# Patient Record
Sex: FEMALE | Race: WHITE | NOT HISPANIC OR LATINO | Employment: PART TIME | URBAN - METROPOLITAN AREA
[De-identification: names, ages, dates, MRNs, and addresses within clinical notes are randomized per-mention and may not be internally consistent; named-entity substitution may affect disease eponyms.]

---

## 2017-02-10 ENCOUNTER — OFFICE VISIT (OUTPATIENT)
Dept: URGENT CARE | Facility: CLINIC | Age: 27
End: 2017-02-10
Payer: COMMERCIAL

## 2017-02-10 VITALS
SYSTOLIC BLOOD PRESSURE: 118 MMHG | HEART RATE: 119 BPM | DIASTOLIC BLOOD PRESSURE: 76 MMHG | WEIGHT: 173.6 LBS | TEMPERATURE: 98.7 F | OXYGEN SATURATION: 97 % | BODY MASS INDEX: 27.9 KG/M2 | RESPIRATION RATE: 18 BRPM | HEIGHT: 66 IN

## 2017-02-10 DIAGNOSIS — J02.0 STREP THROAT: ICD-10-CM

## 2017-02-10 DIAGNOSIS — R21 RASH: ICD-10-CM

## 2017-02-10 LAB
INT CON NEG: NEGATIVE
INT CON POS: POSITIVE
S PYO AG THROAT QL: POSITIVE

## 2017-02-10 PROCEDURE — 87880 STREP A ASSAY W/OPTIC: CPT | Performed by: PHYSICIAN ASSISTANT

## 2017-02-10 PROCEDURE — 99214 OFFICE O/P EST MOD 30 MIN: CPT | Mod: 25 | Performed by: PHYSICIAN ASSISTANT

## 2017-02-10 RX ORDER — AZITHROMYCIN 500 MG/1
500 TABLET, FILM COATED ORAL DAILY
Qty: 5 TAB | Refills: 0 | Status: SHIPPED | OUTPATIENT
Start: 2017-02-10 | End: 2017-02-15

## 2017-02-10 ASSESSMENT — ENCOUNTER SYMPTOMS
CHILLS: 0
HEADACHES: 0
SORE THROAT: 1
SWOLLEN GLANDS: 1
SHORTNESS OF BREATH: 0
PALPITATIONS: 0
COUGH: 0
FEVER: 1

## 2017-02-10 NOTE — MR AVS SNAPSHOT
"        Brooke Thomas   2/10/2017 8:15 AM   Office Visit   MRN: [id number]    Department:  [facility]   Dept Phone:  [phone]    Description:  Female : 1990   Provider:  Gui Marshall PA-C           Reason for Visit     Sore Throat x saturday started having a rash, developed a small rash on hip monday, nasal congestion       Allergies as of 2/10/2017     Allergen Noted Reactions    Food 2014   Hives    Shrimp,walnuts (avoids all nuts), apples, and tomatoes.    Amoxicillin 2009   Rash      You were diagnosed with     Sore throat   [[id number]]         Vital Signs     Blood Pressure Pulse Temperature Respirations Height Weight    118/76 mmHg 119 37.1 °C (98.7 °F) 18 1.676 m (5' 6\") 78.744 kg (173 lb 9.6 oz)    Body Mass Index Oxygen Saturation Smoking Status             28.03 kg/m2 97% Never Smoker          Basic Information     Date Of Birth Sex Race Ethnicity Preferred Language    1990 Female White Non- English      Problem List              ICD-10-CM Priority Class Noted - Resolved    Acute pain of left knee M25.562   10/6/2016 - Present      Health Maintenance        Date Due Completion Dates    IMM DTaP/Tdap/Td Vaccine (1 - Tdap) 2007 ---    PAP SMEAR 2014 (Done)    Override on 2011: Done    IMM INFLUENZA (1) 2016 ---            Current Immunizations     No immunizations on file.      Below and/or attached are the medications your provider expects you to take. Review all of your home medications and newly ordered medications with your provider and/or pharmacist. Follow medication instructions as directed by your provider and/or pharmacist. Please keep your medication list with you and share with your provider. Update the information when medications are discontinued, doses are changed, or new medications (including over-the-counter products) are added; and carry medication information at all times in the event of emergency situations    " Allergies:  FOOD - Hives     AMOXICILLIN - Rash               Medications  Valid as of: February 10, 2017 -  8:26 AM    Generic Name Brand Name Tablet Size Instructions for use    EPINEPHrine (Device) EPINEPHrine 0.3 MG/0.3ML 0.3 mg by Injection route Once PRN (for anaphylactic reaction) for 1 dose.        Naproxen (Tab) NAPROSYN 500 MG Take 1 Tab by mouth 2 times a day, with meals.        .                 Medicines prescribed today were sent to:     [id number], [place] - [address] [place] [address]    Phone: [phone] Fax: [phone]    Open 24 Hours?: No      Medication refill instructions:       If your prescription bottle indicates you have medication refills left, it is not necessary to call your provider’s office. Please contact your pharmacy and they will refill your medication.    If your prescription bottle indicates you do not have any refills left, you may request refills at any time through one of the following ways: The online [employer] system (except Urgent Care), by calling your provider’s office, or by asking your pharmacy to contact your provider’s office with a refill request. Medication refills are processed only during regular business hours and may not be available until the next business day. Your provider may request additional information or to have a follow-up visit with you prior to refilling your medication.   *Please Note: Medication refills are assigned a new Rx number when refilled electronically. Your pharmacy may indicate that no refills were authorized even though a new prescription for the same medication is available at the pharmacy. Please request the medicine by name with the pharmacy before contacting your provider for a refill.           Artk[employer] Access Code: Activation code not generated  Current Artk[employer] Status: Active

## 2017-02-10 NOTE — PROGRESS NOTES
Subjective:      Brooke Thomas is a 26 y.o. female who presents with Sore Throat            Pharyngitis   This is a new problem. Episode onset: 3 days ago. The problem has been unchanged. The fever has been present for less than 1 day. The pain is mild. Associated symptoms include congestion and swollen glands. Pertinent negatives include no coughing, ear pain, headaches or shortness of breath. Associated symptoms comments: Rash  . She has tried nothing for the symptoms.       Review of Systems   Constitutional: Positive for fever. Negative for chills.   HENT: Positive for congestion and sore throat. Negative for ear pain.    Respiratory: Negative for cough and shortness of breath.    Cardiovascular: Negative for chest pain and palpitations.   Skin: Positive for rash. Negative for itching.   Neurological: Negative for headaches.     All other systems reviewed and are negative.  PMH:  has a past medical history of Food allergy.  MEDS:   Current outpatient prescriptions:   •  azithromycin (ZITHROMAX) 500 MG tablet, Take 1 Tab by mouth every day for 5 days., Disp: 5 Tab, Rfl: 0  •  EPINEPHrine 0.3 MG/0.3ML JOHN, 0.3 mg by Injection route Once PRN (for anaphylactic reaction) for 1 dose., Disp: 1 Device, Rfl: 11  •  naproxen (NAPROSYN) 500 MG Tab, Take 1 Tab by mouth 2 times a day, with meals., Disp: 30 Tab, Rfl: 0  ALLERGIES:   Allergies   Allergen Reactions   • Food Hives     Shrimp,walnuts (avoids all nuts), apples, and tomatoes.   • Amoxicillin Rash     SURGHX:   Past Surgical History   Procedure Laterality Date   • Septoplasty     • Septoplasty       [place] - 16 yrs old     SOCHX:  reports that she has never smoked. She has never used smokeless tobacco. She reports that she does not drink alcohol or use illicit drugs.  FH: Family history was reviewed, no pertinent findings to report  Medications, Allergies, and current problem list reviewed today in Epic       Objective:     /76 mmHg  Pulse 119   "Temp([facility]) 37.1 °C (98.7 °F)  Resp 18  Ht 1.676 m (5' 6\")  Wt 78.744 kg (173 lb 9.6 oz)  BMI 28.03 kg/m2  SpO2 97%     Physical Exam   Constitutional: She is oriented to person, place, and time. Vital signs are normal. She appears well-developed and well-nourished.   HENT:   Head: Normocephalic and atraumatic.   Right Ear: Hearing, tympanic membrane, external ear and ear canal normal.   Left Ear: Hearing, tympanic membrane, external ear and ear canal normal.   Nose: Nose normal.   Mouth/Throat: Uvula is midline and mucous membranes are normal. Posterior oropharyngeal erythema present. No oropharyngeal exudate, posterior oropharyngeal edema or tonsillar abscesses.   Neck: Normal range of motion. Neck supple.   Cardiovascular: Normal rate and regular rhythm.  Exam reveals no gallop and no friction rub.    No murmur heard.  Pulmonary/Chest: Effort normal and breath sounds normal. She has no wheezes. She has no rales.   Lymphadenopathy:     She has cervical adenopathy.   Neurological: She is alert and oriented to person, place, and time.   Skin: Skin is warm and dry. Rash noted.   Multiple erythematic papules on right hip   Psychiatric: She has a normal mood and affect. Her behavior is normal. Judgment and thought content normal.   Vitals reviewed.              Assessment/Plan:   Pt is a 26 year old female who presents with a sore throat for 3 days.  The pain is gradually worsening. Patient reports fever and rash. Denies cough. Physical exam is remarkable for posterior pharynx erythema without exudate.  Rapid Strep is positive.   There is a rash on her right hip with multiple erythematic papules.  It is mildly irritating.  This may be attributed to the strep although it is not the classic appearance of a fine sandpaper rash.  Allergy to amoxicillin.    Rapid Strep: positive    1. Strep throat    - POCT Rapid Strep A  - azithromycin (ZITHROMAX) 500 MG tablet; Take 1 Tab by mouth every day for 5 days.  Dispense: 5 " Tab; Refill: 0    2. Rash  -hydrocortisone cream  -observe  Differential diagnosis, natural history, supportive care, and indications for immediate follow-up discussed at length.   Follow-up with primary care provider within 4-5 days, emergency room precautions discussed.  Patient and/or family appears understanding of information.

## 2017-02-13 ENCOUNTER — OFFICE VISIT (OUTPATIENT)
Dept: URGENT CARE | Facility: CLINIC | Age: 27
End: 2017-02-13
Payer: COMMERCIAL

## 2017-02-13 VITALS
HEIGHT: 66 IN | RESPIRATION RATE: 16 BRPM | OXYGEN SATURATION: 98 % | BODY MASS INDEX: 28.14 KG/M2 | TEMPERATURE: 99.2 F | DIASTOLIC BLOOD PRESSURE: 78 MMHG | HEART RATE: 102 BPM | WEIGHT: 175.1 LBS | SYSTOLIC BLOOD PRESSURE: 118 MMHG

## 2017-02-13 DIAGNOSIS — B02.8 HERPES ZOSTER WITH COMPLICATION: ICD-10-CM

## 2017-02-13 PROCEDURE — 99214 OFFICE O/P EST MOD 30 MIN: CPT | Performed by: NURSE PRACTITIONER

## 2017-02-13 RX ORDER — VALACYCLOVIR HYDROCHLORIDE 1 G/1
1000 TABLET, FILM COATED ORAL 3 TIMES DAILY
Qty: 21 TAB | Refills: 0 | Status: ON HOLD | OUTPATIENT
Start: 2017-02-13 | End: 2017-11-09

## 2017-02-13 RX ORDER — IBUPROFEN 800 MG/1
800 TABLET ORAL EVERY 8 HOURS PRN
Qty: 90 TAB | Refills: 1 | Status: ON HOLD | OUTPATIENT
Start: 2017-02-13 | End: 2017-11-09

## 2017-02-13 ASSESSMENT — ENCOUNTER SYMPTOMS
CHILLS: 0
TINGLING: 0
FEVER: 0

## 2017-02-13 NOTE — MR AVS SNAPSHOT
"        Brooke Suresht   2017 3:30 PM   Office Visit   MRN: [id number]    Department:  [facility]   Dept Phone:  [phone]    Description:  Female : 1990   Provider:  DAKOTA Mejia           Reason for Visit     Rash x10 days hip area, painful      Allergies as of 2017     Allergen Noted Reactions    Food 2014   Hives    Shrimp,walnuts (avoids all nuts), apples, and tomatoes.    Amoxicillin 2009   Rash      You were diagnosed with     Herpes zoster with complication   [[id number]]         Vital Signs     Blood Pressure Pulse Temperature Respirations Height Weight    118/78 mmHg 102 37.3 °C (99.2 °F) 16 1.676 m (5' 5.98\") 79.425 kg (175 lb 1.6 oz)    Body Mass Index Oxygen Saturation Smoking Status             28.28 kg/m2 98% Never Smoker          Basic Information     Date Of Birth Sex Race Ethnicity Preferred Language    1990 Female White Non- English      Problem List              ICD-10-CM Priority Class Noted - Resolved    Acute pain of left knee M25.562   10/6/2016 - Present      Health Maintenance        Date Due Completion Dates    IMM DTaP/Tdap/Td Vaccine (1 - Tdap) 2007 ---    PAP SMEAR 2014 (Done)    Override on 2011: Done    IMM INFLUENZA (1) 2016 ---            Current Immunizations     No immunizations on file.      Below and/or attached are the medications your provider expects you to take. Review all of your home medications and newly ordered medications with your provider and/or pharmacist. Follow medication instructions as directed by your provider and/or pharmacist. Please keep your medication list with you and share with your provider. Update the information when medications are discontinued, doses are changed, or new medications (including over-the-counter products) are added; and carry medication information at all times in the event of emergency situations     Allergies:  FOOD - Hives     AMOXICILLIN - Rash    "            Medications  Valid as of: February 13, 2017 -  3:50 PM    Generic Name Brand Name Tablet Size Instructions for use    Azithromycin (Tab) ZITHROMAX 500 MG Take 1 Tab by mouth every day for 5 days.        EPINEPHrine (Device) EPINEPHrine 0.3 MG/0.3ML 0.3 mg by Injection route Once PRN (for anaphylactic reaction) for 1 dose.        Ibuprofen (Tab) MOTRIN 800 MG Take 1 Tab by mouth every 8 hours as needed.        Lidocaine (Gel) Lidocaine 0.5 % 1 Application by Apply externally route 3 times a day.        Naproxen (Tab) NAPROSYN 500 MG Take 1 Tab by mouth 2 times a day, with meals.        ValACYclovir HCl (Tab) VALTREX 1 GM Take 1 Tab by mouth 3 times a day.        .                 Medicines prescribed today were sent to:     [facility] - [place], [place] - [address] [place] [address]    Phone: [phone] Fax: [phone]    Open 24 Hours?: No      Medication refill instructions:       If your prescription bottle indicates you have medication refills left, it is not necessary to call your provider’s office. Please contact your pharmacy and they will refill your medication.    If your prescription bottle indicates you do not have any refills left, you may request refills at any time through one of the following ways: The online [employer] system (except Urgent Care), by calling your provider’s office, or by asking your pharmacy to contact your provider’s office with a refill request. Medication refills are processed only during regular business hours and may not be available until the next business day. Your provider may request additional information or to have a follow-up visit with you prior to refilling your medication.   *Please Note: Medication refills are assigned a new Rx number when refilled electronically. Your pharmacy may indicate that no refills were authorized even though a new prescription for the same medication is available at the pharmacy. Please request the medicine  by name with the pharmacy before contacting your provider for a refill.           MyChart Access Code: Activation code not generated  Current MyChart Status: Active

## 2017-02-13 NOTE — PROGRESS NOTES
"Subjective:      Brooke Thomas is a 26 y.o. female who presents with Rash            Rash  This is a new problem. The current episode started 1 to 4 weeks ago. The problem is unchanged. The affected locations include the right hip. The rash is characterized by blistering and redness. She was exposed to nothing. Associated symptoms include congestion. Pertinent negatives include no fever. Past treatments include antihistamine and anti-itch cream. Her past medical history is significant for varicella.       Review of Systems   Constitutional: Negative for fever, chills and malaise/fatigue.   HENT: Positive for congestion.    Skin: Positive for rash.   Neurological: Negative for tingling.          Objective:     /78 mmHg  Pulse 102  Temp(Src) 37.3 °C (99.2 °F)  Resp 16  Ht 1.676 m (5' 5.98\")  Wt 79.425 kg (175 lb 1.6 oz)  BMI 28.28 kg/m2  SpO2 98%     Physical Exam   Constitutional: She is oriented to person, place, and time. She appears well-developed and well-nourished. No distress.   Cardiovascular: Normal rate, regular rhythm and normal heart sounds.    No murmur heard.  Pulmonary/Chest: Effort normal and breath sounds normal.   Musculoskeletal: Normal range of motion.   Neurological: She is alert and oriented to person, place, and time.   Skin: Skin is warm. Rash noted. Rash is vesicular. There is erythema.        Nursing note and vitals reviewed.              Assessment/Plan:     1. Herpes zoster with complication  valacyclovir (VALTREX) 1 GM Tab    Lidocaine 0.5 % Gel    ibuprofen (MOTRIN) 800 MG Tab     Differential diagnosis, natural history, supportive care, and indications for immediate follow-up discussed at length.         "

## 2017-04-19 ENCOUNTER — HOSPITAL ENCOUNTER (OUTPATIENT)
Dept: LAB | Facility: MEDICAL CENTER | Age: 27
End: 2017-04-19
Attending: NURSE PRACTITIONER
Payer: COMMERCIAL

## 2017-04-19 LAB
ABO GROUP BLD: NORMAL
BASOPHILS # BLD AUTO: 0.1 % (ref 0–1.8)
BASOPHILS # BLD: 0.01 K/UL (ref 0–0.12)
BLD GP AB SCN SERPL QL: NORMAL
EOSINOPHIL # BLD AUTO: 0.24 K/UL (ref 0–0.51)
EOSINOPHIL NFR BLD: 2 % (ref 0–6.9)
ERYTHROCYTE [DISTWIDTH] IN BLOOD BY AUTOMATED COUNT: 43.7 FL (ref 35.9–50)
HBV SURFACE AG SER QL: NEGATIVE
HCT VFR BLD AUTO: 40.8 % (ref 37–47)
HGB BLD-MCNC: 13.8 G/DL (ref 12–16)
IMM GRANULOCYTES # BLD AUTO: 0.05 K/UL (ref 0–0.11)
IMM GRANULOCYTES NFR BLD AUTO: 0.4 % (ref 0–0.9)
LYMPHOCYTES # BLD AUTO: 2.48 K/UL (ref 1–4.8)
LYMPHOCYTES NFR BLD: 20.5 % (ref 22–41)
MCH RBC QN AUTO: 30.8 PG (ref 27–33)
MCHC RBC AUTO-ENTMCNC: 33.8 G/DL (ref 33.6–35)
MCV RBC AUTO: 91.1 FL (ref 81.4–97.8)
MONOCYTES # BLD AUTO: 0.75 K/UL (ref 0–0.85)
MONOCYTES NFR BLD AUTO: 6.2 % (ref 0–13.4)
NEUTROPHILS # BLD AUTO: 8.57 K/UL (ref 2–7.15)
NEUTROPHILS NFR BLD: 70.8 % (ref 44–72)
NRBC # BLD AUTO: 0 K/UL
NRBC BLD AUTO-RTO: 0 /100 WBC
PLATELET # BLD AUTO: 302 K/UL (ref 164–446)
PMV BLD AUTO: 10.6 FL (ref 9–12.9)
RBC # BLD AUTO: 4.48 M/UL (ref 4.2–5.4)
RH BLD: NORMAL
RUBV AB SER QL: 9.8 IU/ML
TREPONEMA PALLIDUM IGG+IGM AB [PRESENCE] IN SERUM OR PLASMA BY IMMUNOASSAY: NON REACTIVE
WBC # BLD AUTO: 12.1 K/UL (ref 4.8–10.8)

## 2017-04-19 PROCEDURE — 81220 CFTR GENE COM VARIANTS: CPT

## 2017-04-19 PROCEDURE — 86900 BLOOD TYPING SEROLOGIC ABO: CPT

## 2017-04-19 PROCEDURE — 86762 RUBELLA ANTIBODY: CPT

## 2017-04-19 PROCEDURE — 86850 RBC ANTIBODY SCREEN: CPT

## 2017-04-19 PROCEDURE — 85025 COMPLETE CBC W/AUTO DIFF WBC: CPT

## 2017-04-19 PROCEDURE — 86901 BLOOD TYPING SEROLOGIC RH(D): CPT

## 2017-04-19 PROCEDURE — 86780 TREPONEMA PALLIDUM: CPT

## 2017-04-19 PROCEDURE — 87340 HEPATITIS B SURFACE AG IA: CPT

## 2017-04-19 PROCEDURE — 87086 URINE CULTURE/COLONY COUNT: CPT

## 2017-04-21 LAB
BACTERIA UR CULT: NORMAL
SIGNIFICANT IND 70042: NORMAL
SITE SITE: NORMAL
SOURCE SOURCE: NORMAL

## 2017-04-25 LAB
CF EXPANDED VARIANT PANEL INTERP Q4864: ABNORMAL
CFTR ALLELE 1 BLD/T QL: ABNORMAL
CFTR ALLELE 1 BLD/T QL: NEGATIVE
CFTR MUT ANL BLD/T: ABNORMAL

## 2017-05-08 ENCOUNTER — HOSPITAL ENCOUNTER (OUTPATIENT)
Dept: LAB | Facility: MEDICAL CENTER | Age: 27
End: 2017-05-08
Attending: OBSTETRICS & GYNECOLOGY
Payer: COMMERCIAL

## 2017-05-08 PROCEDURE — 84702 CHORIONIC GONADOTROPIN TEST: CPT

## 2017-05-08 PROCEDURE — 36415 COLL VENOUS BLD VENIPUNCTURE: CPT

## 2017-05-08 PROCEDURE — 84163 PAPPA SERUM: CPT

## 2017-05-11 LAB
# FETUSES US: 1
ADDITIONAL US Z4552: NORMAL
AGE AT DELIVERY: 29.6 YEARS
COMMENT  Z4564: NORMAL
FET CRL US.MEAS: 64 MM
FET NUCHAL FOLD MOM THICKNESS US.MEAS: 0.9
FET NUCHAL FOLD THICKNESS US.MEAS: 1.5 MM
FET TS 18 RISK FROM MAT AGE: NORMAL
FET TS 21 RISK FROM MAT AGE: NORMAL
GA: 12.7 WEEKS
HCG ADJ MOM SERPL: 1.06
HCG SERPL-ACNC: 81.7 IU/ML
INTEGRATED SCN PATIENT-IMP: NORMAL
NOTE Z4565: NORMAL
PAPP-A MOM SERPL: 0.38
PAPP-A SERPL-MCNC: 314.3 NG/ML
RESULTS Z4535: NORMAL
SONOGRAPHER: NORMAL
SUBMIT PART2 SAMPLE USING Z4537: NORMAL
TS 18 RISK FETUS: NORMAL
TS 21 RISK FETUS: NORMAL
US DATE: NORMAL

## 2017-06-01 ENCOUNTER — HOSPITAL ENCOUNTER (OUTPATIENT)
Dept: LAB | Facility: MEDICAL CENTER | Age: 27
End: 2017-06-01
Attending: OBSTETRICS & GYNECOLOGY
Payer: COMMERCIAL

## 2017-06-01 PROCEDURE — 82677 ASSAY OF ESTRIOL: CPT

## 2017-06-01 PROCEDURE — 82105 ALPHA-FETOPROTEIN SERUM: CPT

## 2017-06-01 PROCEDURE — 84702 CHORIONIC GONADOTROPIN TEST: CPT

## 2017-06-01 PROCEDURE — 36415 COLL VENOUS BLD VENIPUNCTURE: CPT

## 2017-06-01 PROCEDURE — 86336 INHIBIN A: CPT

## 2017-06-05 LAB
# FETUSES US: 1
ADDITIONAL US Z4552: NORMAL
AFP ADJ MOM SERPL: 0.81
AFP SERPL-MCNC: 22.7 NG/ML
AGE AT DELIVERY: 29.6 YEARS
COLLECT DATE: NORMAL
COLLECT DATE: NORMAL
COMMENT  Z4564: NORMAL
FET CRL US.MEAS: 64 MM
FET NUCHAL FOLD MOM THICKNESS US.MEAS: 0.9
FET NUCHAL FOLD THICKNESS US.MEAS: 1.5 MM
FET TS 18 RISK FROM MAT AGE: NORMAL
FET TS 21 RISK FROM MAT AGE: NORMAL
GA: 12.7 WEEKS
GA: 16.3 WEEKS
HCG ADJ MOM SERPL: 0.9
HCG SERPL-ACNC: 27.2 IU/ML
IDDM PATIENT QL: NO
INHIBIN A ADJ MOM SERPL: 1.1
INHIBIN A SERPL-MCNC: 173.5 PG/ML
INTEGRATED SCN PATIENT-IMP: NORMAL
NEURAL TUBE DEFECT RISK FETUS: NORMAL %
NOTE Z4565: NORMAL
PAPP-A MOM SERPL: 0.38
PAPP-A SERPL-MCNC: 314.3 NG/ML
RESULTS Z4535: NORMAL
SONOGRAPHER: NORMAL
TS 18 RISK FETUS: NORMAL
TS 21 RISK FETUS: NORMAL
U ESTRIOL ADJ MOM SERPL: 1.24
U ESTRIOL SERPL-MCNC: 1.02 NG/ML
US DATE: NORMAL

## 2017-07-08 ENCOUNTER — OFFICE VISIT (OUTPATIENT)
Dept: URGENT CARE | Facility: CLINIC | Age: 27
End: 2017-07-08
Payer: COMMERCIAL

## 2017-07-08 ENCOUNTER — HOSPITAL ENCOUNTER (OUTPATIENT)
Facility: MEDICAL CENTER | Age: 27
End: 2017-07-08
Attending: PHYSICIAN ASSISTANT
Payer: COMMERCIAL

## 2017-07-08 VITALS
BODY MASS INDEX: 30.84 KG/M2 | DIASTOLIC BLOOD PRESSURE: 80 MMHG | WEIGHT: 191 LBS | TEMPERATURE: 98.2 F | HEART RATE: 92 BPM | OXYGEN SATURATION: 99 % | RESPIRATION RATE: 16 BRPM | SYSTOLIC BLOOD PRESSURE: 112 MMHG

## 2017-07-08 DIAGNOSIS — L91.8 INFLAMED SKIN TAG: ICD-10-CM

## 2017-07-08 PROCEDURE — 99000 SPECIMEN HANDLING OFFICE-LAB: CPT | Performed by: PHYSICIAN ASSISTANT

## 2017-07-08 PROCEDURE — 88304 TISSUE EXAM BY PATHOLOGIST: CPT

## 2017-07-08 PROCEDURE — 99213 OFFICE O/P EST LOW 20 MIN: CPT | Performed by: PHYSICIAN ASSISTANT

## 2017-07-08 ASSESSMENT — ENCOUNTER SYMPTOMS
MUSCULOSKELETAL NEGATIVE: 1
CONSTITUTIONAL NEGATIVE: 1
NEUROLOGICAL NEGATIVE: 1

## 2017-07-08 NOTE — MR AVS SNAPSHOT
Brooke Thomas   2017 1:45 PM   Office Visit   MRN: [id number]    Department:  [facility]   Dept Phone:  [phone]    Description:  Female : 1990   Provider:  Kapil Ho PA-C           Reason for Visit     Nevus x 1 year  mold on Rt. side, getting red in the last few days      Allergies as of 2017     Allergen Noted Reactions    Food 2014   Hives    Shrimp,walnuts (avoids all nuts), apples, and tomatoes.    Amoxicillin 2009   Rash      You were diagnosed with     Inflamed skin tag   [[id number]]         Vital Signs     Blood Pressure Pulse Temperature Respirations Weight Oxygen Saturation    112/80 mmHg 92 36.8 °C (98.2 °F) 16 86.637 kg (191 lb) 99%    Smoking Status                   Never Smoker            Basic Information     Date Of Birth Sex Race Ethnicity Preferred Language    1990 Female White Non- English      Problem List              ICD-10-CM Priority Class Noted - Resolved    Acute pain of left knee M25.562   10/6/2016 - Present      Health Maintenance        Date Due Completion Dates    IMM DTaP/Tdap/Td Vaccine (1 - Tdap) 2007 ---    PAP SMEAR 2014 (Done)    Override on 2011: Done    IMM INFLUENZA (1) 2017 ---            Current Immunizations     No immunizations on file.      Below and/or attached are the medications your provider expects you to take. Review all of your home medications and newly ordered medications with your provider and/or pharmacist. Follow medication instructions as directed by your provider and/or pharmacist. Please keep your medication list with you and share with your provider. Update the information when medications are discontinued, doses are changed, or new medications (including over-the-counter products) are added; and carry medication information at all times in the event of emergency situations     Allergies:  FOOD - Hives     AMOXICILLIN - Rash               Medications  Valid as of:  July 08, 2017 -  4:07 PM    Generic Name Brand Name Tablet Size Instructions for use    EPINEPHrine (Device) EPINEPHrine 0.3 MG/0.3ML 0.3 mg by Injection route Once PRN (for anaphylactic reaction) for 1 dose.        Ibuprofen (Tab) MOTRIN 800 MG Take 1 Tab by mouth every 8 hours as needed.        Lidocaine (Gel) Lidocaine 0.5 % 1 Application by Apply externally route 3 times a day.        Naproxen (Tab) NAPROSYN 500 MG Take 1 Tab by mouth 2 times a day, with meals.        ValACYclovir HCl (Tab) VALTREX 1 GM Take 1 Tab by mouth 3 times a day.        .                 Medicines prescribed today were sent to:     [facility] - [place], [place] - [address] [facility]    [address] [place] [address]    Phone: [phone] Fax: [phone]    Open 24 Hours?: No      Medication refill instructions:       If your prescription bottle indicates you have medication refills left, it is not necessary to call your provider’s office. Please contact your pharmacy and they will refill your medication.    If your prescription bottle indicates you do not have any refills left, you may request refills at any time through one of the following ways: The online [employer] system (except Urgent Care), by calling your provider’s office, or by asking your pharmacy to contact your provider’s office with a refill request. Medication refills are processed only during regular business hours and may not be available until the next business day. Your provider may request additional information or to have a follow-up visit with you prior to refilling your medication.   *Please Note: Medication refills are assigned a new Rx number when refilled electronically. Your pharmacy may indicate that no refills were authorized even though a new prescription for the same medication is available at the pharmacy. Please request the medicine by name with the pharmacy before contacting your provider for a refill.        Your To Do List     Future Labs/Procedures  Complete By Expires    PATHOLOGY SPECIMEN  As directed 7/8/2018    Comments:    Right side, inflamed skin tag [or other]         [employer] Access Code: Activation code not generated  Current [employer] Status: Active

## 2017-07-08 NOTE — PROGRESS NOTES
Subjective:      Brooke Thomas is a 27 y.o. female who presents with Nevus            Other  This is a chronic problem. The current episode started more than 1 year ago (mole x 1yr; some color changing). The problem occurs constantly. The problem has been unchanged. Pertinent negatives include no rash. Nothing aggravates the symptoms. She has tried nothing for the symptoms. The treatment provided no relief.       Review of Systems   Constitutional: Negative.    Musculoskeletal: Negative.    Skin: Negative.  Negative for rash.   Neurological: Negative.           Objective:     /80 mmHg  Pulse 92  Temp(Src) 36.8 °C (98.2 °F)  Resp 16  Wt 86.637 kg (191 lb)  SpO2 99%     Physical Exam   Constitutional: She is oriented to person, place, and time. She appears well-developed and well-nourished. No distress.   Neurological: She is alert and oriented to person, place, and time.   Skin: Skin is warm and dry.   r side , inflamed skin tag <1cm; non tend.  Proc- 1%lidoc local; snipped skin tag/les.off at base, some bleeding, abx dssg   Psychiatric: She has a normal mood and affect. Her behavior is normal. Judgment and thought content normal.   Nursing note and vitals reviewed.    Filed Vitals:    07/08/17 1409   BP: 112/80   Pulse: 92   Temp: 36.8 °C (98.2 °F)   Resp: 16   Weight: 86.637 kg (191 lb)   SpO2: 99%     Active Ambulatory Problems     Diagnosis Date Noted   • Acute pain of left knee 10/06/2016     Resolved Ambulatory Problems     Diagnosis Date Noted   • No Resolved Ambulatory Problems     Past Medical History   Diagnosis Date   • Food allergy      Current Outpatient Prescriptions on File Prior to Visit   Medication Sig Dispense Refill   • valacyclovir (VALTREX) 1 GM Tab Take 1 Tab by mouth 3 times a day. 21 Tab 0   • Lidocaine 0.5 % Gel 1 Application by Apply externally route 3 times a day. 1 Tube 1   • ibuprofen (MOTRIN) 800 MG Tab Take 1 Tab by mouth every 8 hours as needed. 90 Tab 1   • naproxen  "(NAPROSYN) 500 MG Tab Take 1 Tab by mouth 2 times a day, with meals. 30 Tab 0   • EPINEPHrine 0.3 MG/0.3ML JOHN 0.3 mg by Injection route Once PRN (for anaphylactic reaction) for 1 dose. 1 Device 11     No current facility-administered medications on file prior to visit.     Gargles, Cepacol lozenges, Aleve/Advil as needed for throat pain  Family History   Problem Relation Age of Onset   • Lung Disease Brother      asthma   • Lung Disease Paternal Grandfather      \"farmer's lung\"   • Genetic Neg Hx    • Cancer Neg Hx    • Diabetes Neg Hx    • Psychiatry Neg Hx      Food and Amoxicillin              Assessment/Plan:     ·  inflamed skin tag      · Local wound care; path sample sent      "

## 2017-07-16 ENCOUNTER — TELEPHONE (OUTPATIENT)
Dept: URGENT CARE | Facility: CLINIC | Age: 27
End: 2017-07-16

## 2017-07-17 ENCOUNTER — TELEPHONE (OUTPATIENT)
Dept: MEDICAL GROUP | Facility: CLINIC | Age: 27
End: 2017-07-17

## 2017-08-12 ENCOUNTER — HOSPITAL ENCOUNTER (OUTPATIENT)
Dept: LAB | Facility: MEDICAL CENTER | Age: 27
End: 2017-08-12
Attending: OBSTETRICS & GYNECOLOGY
Payer: COMMERCIAL

## 2017-08-12 LAB — GLUCOSE 1H P 50 G GLC PO SERPL-MCNC: 133 MG/DL (ref 70–139)

## 2017-08-12 PROCEDURE — 82950 GLUCOSE TEST: CPT

## 2017-08-12 PROCEDURE — 36415 COLL VENOUS BLD VENIPUNCTURE: CPT

## 2017-08-19 ENCOUNTER — HOSPITAL ENCOUNTER (OUTPATIENT)
Dept: LAB | Facility: MEDICAL CENTER | Age: 27
End: 2017-08-19
Attending: OBSTETRICS & GYNECOLOGY
Payer: COMMERCIAL

## 2017-08-19 LAB
GLUCOSE 1H P CHAL SERPL-MCNC: 156 MG/DL (ref 65–180)
GLUCOSE 2H P CHAL SERPL-MCNC: 139 MG/DL (ref 65–155)
GLUCOSE 3H P CHAL SERPL-MCNC: 60 MG/DL (ref 65–140)
GLUCOSE BS SERPL-MCNC: 73 MG/DL (ref 65–95)

## 2017-08-19 PROCEDURE — 82951 GLUCOSE TOLERANCE TEST (GTT): CPT

## 2017-08-19 PROCEDURE — 36415 COLL VENOUS BLD VENIPUNCTURE: CPT

## 2017-08-19 PROCEDURE — 82952 GTT-ADDED SAMPLES: CPT

## 2017-09-27 ENCOUNTER — HOSPITAL ENCOUNTER (OUTPATIENT)
Facility: MEDICAL CENTER | Age: 27
End: 2017-09-27
Attending: OBSTETRICS & GYNECOLOGY | Admitting: OBSTETRICS & GYNECOLOGY
Payer: COMMERCIAL

## 2017-09-27 VITALS
SYSTOLIC BLOOD PRESSURE: 129 MMHG | TEMPERATURE: 98.8 F | BODY MASS INDEX: 32.95 KG/M2 | DIASTOLIC BLOOD PRESSURE: 79 MMHG | HEART RATE: 100 BPM | HEIGHT: 66 IN | WEIGHT: 205 LBS

## 2017-09-27 LAB
APPEARANCE UR: CLEAR
COLOR UR AUTO: YELLOW
GLUCOSE UR QL STRIP.AUTO: NEGATIVE MG/DL
KETONES UR QL STRIP.AUTO: NEGATIVE MG/DL
LEUKOCYTE ESTERASE UR QL STRIP.AUTO: ABNORMAL
NITRITE UR QL STRIP.AUTO: NEGATIVE
PH UR STRIP.AUTO: 7 [PH]
PROT UR QL STRIP: NEGATIVE MG/DL
RBC UR QL AUTO: ABNORMAL
SP GR UR: 1.01

## 2017-09-27 PROCEDURE — 81002 URINALYSIS NONAUTO W/O SCOPE: CPT

## 2017-09-27 PROCEDURE — 59025 FETAL NON-STRESS TEST: CPT | Performed by: OBSTETRICS & GYNECOLOGY

## 2017-09-27 NOTE — PROGRESS NOTES
Patient comes in with complaints of small amount of bleeding, states that she also has had lower back pain. Denies leaking and is feeling fetal movement.  Monitors applied.    Discussed with Dr Saxena, SVE ordered.  Closed/thick/high.  No blood noted on glove.     Dr Saxena called back, discussed sve, contractions and UA results. Patient to be discharged and to call office and make appointment to see Dr Saxena tomorrow.    Discharge instructions given to patient.  Patient ambulated out.

## 2017-09-29 ENCOUNTER — HOSPITAL ENCOUNTER (OUTPATIENT)
Facility: MEDICAL CENTER | Age: 27
End: 2017-09-29
Attending: OBSTETRICS & GYNECOLOGY | Admitting: OBSTETRICS & GYNECOLOGY
Payer: COMMERCIAL

## 2017-09-29 VITALS
DIASTOLIC BLOOD PRESSURE: 79 MMHG | HEIGHT: 66 IN | BODY MASS INDEX: 32.95 KG/M2 | HEART RATE: 103 BPM | SYSTOLIC BLOOD PRESSURE: 123 MMHG | TEMPERATURE: 97.6 F | WEIGHT: 205 LBS | RESPIRATION RATE: 12 BRPM

## 2017-09-29 PROCEDURE — 96375 TX/PRO/DX INJ NEW DRUG ADDON: CPT

## 2017-09-29 PROCEDURE — 700111 HCHG RX REV CODE 636 W/ 250 OVERRIDE (IP): Performed by: OBSTETRICS & GYNECOLOGY

## 2017-09-29 PROCEDURE — 700111 HCHG RX REV CODE 636 W/ 250 OVERRIDE (IP)

## 2017-09-29 PROCEDURE — 59025 FETAL NON-STRESS TEST: CPT | Performed by: OBSTETRICS & GYNECOLOGY

## 2017-09-29 PROCEDURE — 96374 THER/PROPH/DIAG INJ IV PUSH: CPT

## 2017-09-29 RX ORDER — TERBUTALINE SULFATE 1 MG/ML
INJECTION, SOLUTION SUBCUTANEOUS
Status: COMPLETED
Start: 2017-09-29 | End: 2017-09-29

## 2017-09-29 RX ORDER — BETAMETHASONE SODIUM PHOSPHATE AND BETAMETHASONE ACETATE 3; 3 MG/ML; MG/ML
12 INJECTION, SUSPENSION INTRA-ARTICULAR; INTRALESIONAL; INTRAMUSCULAR; SOFT TISSUE EVERY 24 HOURS
Status: DISCONTINUED | OUTPATIENT
Start: 2017-09-29 | End: 2017-09-29 | Stop reason: HOSPADM

## 2017-09-29 RX ORDER — TERBUTALINE SULFATE 1 MG/ML
0.25 INJECTION, SOLUTION SUBCUTANEOUS ONCE
Status: COMPLETED | OUTPATIENT
Start: 2017-09-29 | End: 2017-09-29

## 2017-09-29 RX ADMIN — TERBUTALINE SULFATE 0.25 MG: 1 INJECTION, SOLUTION SUBCUTANEOUS at 14:11

## 2017-09-29 RX ADMIN — BETAMETHASONE SODIUM PHOSPHATE AND BETAMETHASONE ACETATE 12 MG: 3; 3 INJECTION, SUSPENSION INTRA-ARTICULAR; INTRALESIONAL; INTRAMUSCULAR at 14:40

## 2017-09-29 ASSESSMENT — PAIN SCALES - GENERAL
PAINLEVEL_OUTOF10: 0
PAINLEVEL_OUTOF10: 2

## 2017-09-29 NOTE — PROGRESS NOTES
"1325- 27 yr old, , EDC 11/10/17, 34 wks, arrived to L&D for low back ache and cramping. Pt denies LOF and VB. Pt reports + FM.   [employer] and US on, VS/S.  1340- M. Baljit RN, SVE 1/thick/high, midposition, soft, no fluid or bleeding present. (outer os 2cm/ inner os 1cm).  Dr. Mark notified of pt's status, orders for terbutaline and betamethasone.  Pt to be discharged home and to return tomorrow for second betamethasone injection.  1411- terbutaline administered.  1440- Betamethasone injection administered.  1450- pt states, \"My back feels much better and my cramping is better.\"  1500-Pt discharged with written instructions, pt to come back at 1400 on 17 for second betamethasone.  "

## 2017-09-30 ENCOUNTER — HOSPITAL ENCOUNTER (OUTPATIENT)
Facility: MEDICAL CENTER | Age: 27
End: 2017-09-30
Attending: OBSTETRICS & GYNECOLOGY | Admitting: OBSTETRICS & GYNECOLOGY
Payer: COMMERCIAL

## 2017-09-30 ENCOUNTER — APPOINTMENT (OUTPATIENT)
Dept: OTHER | Facility: IMAGING CENTER | Age: 27
End: 2017-09-30

## 2017-09-30 VITALS — DIASTOLIC BLOOD PRESSURE: 63 MMHG | SYSTOLIC BLOOD PRESSURE: 128 MMHG | HEART RATE: 117 BPM

## 2017-09-30 PROCEDURE — 96372 THER/PROPH/DIAG INJ SC/IM: CPT

## 2017-09-30 PROCEDURE — 700111 HCHG RX REV CODE 636 W/ 250 OVERRIDE (IP): Performed by: OBSTETRICS & GYNECOLOGY

## 2017-09-30 PROCEDURE — 59025 FETAL NON-STRESS TEST: CPT | Performed by: OBSTETRICS & GYNECOLOGY

## 2017-09-30 RX ORDER — BETAMETHASONE SODIUM PHOSPHATE AND BETAMETHASONE ACETATE 3; 3 MG/ML; MG/ML
12 INJECTION, SUSPENSION INTRA-ARTICULAR; INTRALESIONAL; INTRAMUSCULAR; SOFT TISSUE ONCE
Status: COMPLETED | OUTPATIENT
Start: 2017-09-30 | End: 2017-09-30

## 2017-09-30 RX ADMIN — BETAMETHASONE SODIUM PHOSPHATE AND BETAMETHASONE ACETATE 12 MG: 3; 3 INJECTION, SUSPENSION INTRA-ARTICULAR; INTRALESIONAL; INTRAMUSCULAR at 14:29

## 2017-09-30 NOTE — PROGRESS NOTES
Patient is a  hat presents at 34 weeks for second betamethasone injection. The patient was placed on the monitor and vitals taken.   1500 Patient had a possible prolonged decel. Dr. Gomez notified and orders to discharge patient if there is 25 minutes of reactive strip. Patient was given discharge instructions regarding kick counts,  labor precautions, and when to return to the hospital. Patient states understanding and was discharged without incident.

## 2017-10-02 ENCOUNTER — HOSPITAL ENCOUNTER (OUTPATIENT)
Dept: LAB | Facility: MEDICAL CENTER | Age: 27
End: 2017-10-02
Attending: OBSTETRICS & GYNECOLOGY
Payer: COMMERCIAL

## 2017-10-02 LAB — GFR SERPL CREATININE-BSD FRML MDRD: >60 ML/MIN/1.73 M 2

## 2017-10-02 PROCEDURE — 81001 URINALYSIS AUTO W/SCOPE: CPT

## 2017-10-02 PROCEDURE — 84156 ASSAY OF PROTEIN URINE: CPT

## 2017-10-02 PROCEDURE — 82570 ASSAY OF URINE CREATININE: CPT

## 2017-10-02 PROCEDURE — 84550 ASSAY OF BLOOD/URIC ACID: CPT

## 2017-10-02 PROCEDURE — 85027 COMPLETE CBC AUTOMATED: CPT

## 2017-10-02 PROCEDURE — 80053 COMPREHEN METABOLIC PANEL: CPT

## 2017-10-02 PROCEDURE — 83615 LACTATE (LD) (LDH) ENZYME: CPT

## 2017-10-03 LAB
ALBUMIN SERPL BCP-MCNC: 3.7 G/DL (ref 3.2–4.9)
ALBUMIN/GLOB SERPL: 1.1 G/DL
ALP SERPL-CCNC: 111 U/L (ref 30–99)
ALT SERPL-CCNC: 16 U/L (ref 2–50)
ANION GAP SERPL CALC-SCNC: 10 MMOL/L (ref 0–11.9)
APPEARANCE UR: CLEAR
AST SERPL-CCNC: 18 U/L (ref 12–45)
BACTERIA #/AREA URNS HPF: ABNORMAL /HPF
BILIRUB SERPL-MCNC: 0.2 MG/DL (ref 0.1–1.5)
BILIRUB UR QL STRIP.AUTO: NEGATIVE
BUN SERPL-MCNC: 7 MG/DL (ref 8–22)
CALCIUM SERPL-MCNC: 9.2 MG/DL (ref 8.5–10.5)
CHLORIDE SERPL-SCNC: 103 MMOL/L (ref 96–112)
CO2 SERPL-SCNC: 22 MMOL/L (ref 20–33)
COLOR UR: YELLOW
CREAT SERPL-MCNC: 0.46 MG/DL (ref 0.5–1.4)
CREAT UR-MCNC: 42.5 MG/DL
EPI CELLS #/AREA URNS HPF: ABNORMAL /HPF
ERYTHROCYTE [DISTWIDTH] IN BLOOD BY AUTOMATED COUNT: 47.6 FL (ref 35.9–50)
GLOBULIN SER CALC-MCNC: 3.5 G/DL (ref 1.9–3.5)
GLUCOSE SERPL-MCNC: 55 MG/DL (ref 65–99)
GLUCOSE UR STRIP.AUTO-MCNC: NEGATIVE MG/DL
HCT VFR BLD AUTO: 36.9 % (ref 37–47)
HGB BLD-MCNC: 11.8 G/DL (ref 12–16)
KETONES UR STRIP.AUTO-MCNC: NEGATIVE MG/DL
LDH SERPL-CCNC: 304 U/L (ref 107–266)
LEUKOCYTE ESTERASE UR QL STRIP.AUTO: ABNORMAL
MCH RBC QN AUTO: 30 PG (ref 27–33)
MCHC RBC AUTO-ENTMCNC: 32 G/DL (ref 33.6–35)
MCV RBC AUTO: 93.9 FL (ref 81.4–97.8)
MICRO URNS: ABNORMAL
NITRITE UR QL STRIP.AUTO: NEGATIVE
PH UR STRIP.AUTO: 6 [PH]
PLATELET # BLD AUTO: 325 K/UL (ref 164–446)
PMV BLD AUTO: 10.4 FL (ref 9–12.9)
POTASSIUM SERPL-SCNC: 3.8 MMOL/L (ref 3.6–5.5)
PROT SERPL-MCNC: 7.2 G/DL (ref 6–8.2)
PROT UR QL STRIP: NEGATIVE MG/DL
PROT UR-MCNC: 5.2 MG/DL (ref 0–15)
PROT/CREAT UR: 122 MG/G (ref 10–107)
RBC # BLD AUTO: 3.93 M/UL (ref 4.2–5.4)
RBC UR QL AUTO: NEGATIVE
SODIUM SERPL-SCNC: 135 MMOL/L (ref 135–145)
SP GR UR STRIP.AUTO: 1
URATE SERPL-MCNC: 4.9 MG/DL (ref 1.9–8.2)
UROBILINOGEN UR STRIP.AUTO-MCNC: NORMAL MG/DL
WBC # BLD AUTO: 17.1 K/UL (ref 4.8–10.8)
WBC #/AREA URNS HPF: ABNORMAL /HPF

## 2017-10-11 ENCOUNTER — HOSPITAL ENCOUNTER (OUTPATIENT)
Facility: MEDICAL CENTER | Age: 27
End: 2017-10-11
Attending: OBSTETRICS & GYNECOLOGY
Payer: COMMERCIAL

## 2017-10-18 ENCOUNTER — HOSPITAL ENCOUNTER (OUTPATIENT)
Dept: LAB | Facility: MEDICAL CENTER | Age: 27
End: 2017-10-18
Attending: CLINICAL NURSE SPECIALIST
Payer: COMMERCIAL

## 2017-10-18 PROCEDURE — 87653 STREP B DNA AMP PROBE: CPT

## 2017-10-19 LAB — GP B STREP DNA SPEC QL NAA+PROBE: NEGATIVE

## 2017-10-25 ENCOUNTER — HOSPITAL ENCOUNTER (OUTPATIENT)
Facility: MEDICAL CENTER | Age: 27
End: 2017-10-25
Attending: OBSTETRICS & GYNECOLOGY | Admitting: OBSTETRICS & GYNECOLOGY
Payer: COMMERCIAL

## 2017-10-25 VITALS
SYSTOLIC BLOOD PRESSURE: 118 MMHG | WEIGHT: 208 LBS | DIASTOLIC BLOOD PRESSURE: 68 MMHG | BODY MASS INDEX: 33.43 KG/M2 | HEART RATE: 100 BPM | HEIGHT: 66 IN

## 2017-10-25 LAB
A1 MICROGLOB PLACENTAL VAG QL: NEGATIVE
CRYSTALS AMN MICRO: NORMAL

## 2017-10-25 PROCEDURE — 59025 FETAL NON-STRESS TEST: CPT | Performed by: OBSTETRICS & GYNECOLOGY

## 2017-10-25 PROCEDURE — 89060 EXAM SYNOVIAL FLUID CRYSTALS: CPT

## 2017-10-25 PROCEDURE — 84112 EVAL AMNIOTIC FLUID PROTEIN: CPT

## 2017-10-26 NOTE — PROGRESS NOTES
"Pt is a  at 37.4 weeks with leaking of fluid.  Pt states that she was in the shower and noticed a gush of fluid, since she has continued to have small amounts of leaking and mild uc's that are irregular.  EFM applied.  POC discussed,  On exam leaking fluid visualized outside labia sterile spec was placed with white thin discharge noted.  Difficult SVE, pt states she was 3cm and \"thin\" in office on Monday.    ) report to YE Kwok RN  ) Message left for Dr Kerns    "

## 2017-10-26 NOTE — PROGRESS NOTES
1910: Received report from DAYTON Ivan RN. POC discussed awaiting fern results.    1945: Spoke to Dr. Kerns concerning pt reason for triage. Discussed serial BP's, Negative Fern results, per MD do Amnisure.    1955: Amnisure done and sent.    2045: Spoke to Dr. Kerns, lab results reviewed. Discharge orders received.    2100: Pt discharged home via ambulation with , Abdulkadir at side. Discharge instructions given and reviewed. No further concerns or questions at this time.

## 2017-11-09 ENCOUNTER — HOSPITAL ENCOUNTER (INPATIENT)
Facility: MEDICAL CENTER | Age: 27
LOS: 3 days | End: 2017-11-12
Attending: OBSTETRICS & GYNECOLOGY | Admitting: OBSTETRICS & GYNECOLOGY
Payer: COMMERCIAL

## 2017-11-09 LAB
BASOPHILS # BLD AUTO: 0.3 % (ref 0–1.8)
BASOPHILS # BLD: 0.04 K/UL (ref 0–0.12)
EOSINOPHIL # BLD AUTO: 0.15 K/UL (ref 0–0.51)
EOSINOPHIL NFR BLD: 1.2 % (ref 0–6.9)
ERYTHROCYTE [DISTWIDTH] IN BLOOD BY AUTOMATED COUNT: 45 FL (ref 35.9–50)
HCT VFR BLD AUTO: 36.7 % (ref 37–47)
HGB BLD-MCNC: 12.3 G/DL (ref 12–16)
HOLDING TUBE BB 8507: NORMAL
IMM GRANULOCYTES # BLD AUTO: 0.23 K/UL (ref 0–0.11)
IMM GRANULOCYTES NFR BLD AUTO: 1.8 % (ref 0–0.9)
LYMPHOCYTES # BLD AUTO: 2.05 K/UL (ref 1–4.8)
LYMPHOCYTES NFR BLD: 16.4 % (ref 22–41)
MCH RBC QN AUTO: 29.5 PG (ref 27–33)
MCHC RBC AUTO-ENTMCNC: 33.5 G/DL (ref 33.6–35)
MCV RBC AUTO: 88 FL (ref 81.4–97.8)
MONOCYTES # BLD AUTO: 0.87 K/UL (ref 0–0.85)
MONOCYTES NFR BLD AUTO: 7 % (ref 0–13.4)
NEUTROPHILS # BLD AUTO: 9.14 K/UL (ref 2–7.15)
NEUTROPHILS NFR BLD: 73.3 % (ref 44–72)
NRBC # BLD AUTO: 0 K/UL
NRBC BLD AUTO-RTO: 0 /100 WBC
PLATELET # BLD AUTO: 253 K/UL (ref 164–446)
PMV BLD AUTO: 11.1 FL (ref 9–12.9)
RBC # BLD AUTO: 4.17 M/UL (ref 4.2–5.4)
WBC # BLD AUTO: 12.5 K/UL (ref 4.8–10.8)

## 2017-11-09 PROCEDURE — 85025 COMPLETE CBC W/AUTO DIFF WBC: CPT

## 2017-11-09 PROCEDURE — 770002 HCHG ROOM/CARE - OB PRIVATE (112)

## 2017-11-09 PROCEDURE — 700111 HCHG RX REV CODE 636 W/ 250 OVERRIDE (IP): Performed by: OBSTETRICS & GYNECOLOGY

## 2017-11-09 PROCEDURE — 700105 HCHG RX REV CODE 258: Performed by: OBSTETRICS & GYNECOLOGY

## 2017-11-09 PROCEDURE — 700111 HCHG RX REV CODE 636 W/ 250 OVERRIDE (IP)

## 2017-11-09 RX ORDER — ONDANSETRON 4 MG/1
4 TABLET, ORALLY DISINTEGRATING ORAL EVERY 6 HOURS PRN
Status: DISCONTINUED | OUTPATIENT
Start: 2017-11-09 | End: 2017-11-12 | Stop reason: HOSPADM

## 2017-11-09 RX ORDER — SODIUM CHLORIDE, SODIUM LACTATE, POTASSIUM CHLORIDE, CALCIUM CHLORIDE 600; 310; 30; 20 MG/100ML; MG/100ML; MG/100ML; MG/100ML
INJECTION, SOLUTION INTRAVENOUS CONTINUOUS
Status: DISPENSED | OUTPATIENT
Start: 2017-11-09 | End: 2017-11-09

## 2017-11-09 RX ORDER — ROPIVACAINE HYDROCHLORIDE 2 MG/ML
INJECTION, SOLUTION EPIDURAL; INFILTRATION; PERINEURAL
Status: COMPLETED
Start: 2017-11-09 | End: 2017-11-09

## 2017-11-09 RX ORDER — ONDANSETRON 2 MG/ML
4 INJECTION INTRAMUSCULAR; INTRAVENOUS EVERY 6 HOURS PRN
Status: DISCONTINUED | OUTPATIENT
Start: 2017-11-09 | End: 2017-11-12 | Stop reason: HOSPADM

## 2017-11-09 RX ORDER — MISOPROSTOL 200 UG/1
800 TABLET ORAL
Status: DISCONTINUED | OUTPATIENT
Start: 2017-11-09 | End: 2017-11-10 | Stop reason: HOSPADM

## 2017-11-09 RX ORDER — SODIUM CHLORIDE, SODIUM LACTATE, POTASSIUM CHLORIDE, CALCIUM CHLORIDE 600; 310; 30; 20 MG/100ML; MG/100ML; MG/100ML; MG/100ML
INJECTION, SOLUTION INTRAVENOUS CONTINUOUS
Status: DISCONTINUED | OUTPATIENT
Start: 2017-11-09 | End: 2017-11-12 | Stop reason: HOSPADM

## 2017-11-09 RX ADMIN — SODIUM CHLORIDE, POTASSIUM CHLORIDE, SODIUM LACTATE AND CALCIUM CHLORIDE: 600; 310; 30; 20 INJECTION, SOLUTION INTRAVENOUS at 18:42

## 2017-11-09 RX ADMIN — SODIUM CHLORIDE, POTASSIUM CHLORIDE, SODIUM LACTATE AND CALCIUM CHLORIDE: 600; 310; 30; 20 INJECTION, SOLUTION INTRAVENOUS at 15:21

## 2017-11-09 RX ADMIN — SODIUM CHLORIDE, POTASSIUM CHLORIDE, SODIUM LACTATE AND CALCIUM CHLORIDE: 600; 310; 30; 20 INJECTION, SOLUTION INTRAVENOUS at 19:04

## 2017-11-09 RX ADMIN — Medication 1 MILLI-UNITS/MIN: at 08:53

## 2017-11-09 RX ADMIN — FENTANYL CITRATE 100 MCG: 50 INJECTION, SOLUTION INTRAMUSCULAR; INTRAVENOUS at 18:48

## 2017-11-09 RX ADMIN — SODIUM CHLORIDE, POTASSIUM CHLORIDE, SODIUM LACTATE AND CALCIUM CHLORIDE: 600; 310; 30; 20 INJECTION, SOLUTION INTRAVENOUS at 08:05

## 2017-11-09 RX ADMIN — ROPIVACAINE HYDROCHLORIDE 100 ML: 2 INJECTION, SOLUTION EPIDURAL; INFILTRATION at 19:16

## 2017-11-09 ASSESSMENT — LIFESTYLE VARIABLES
DO YOU DRINK ALCOHOL: NO
ALCOHOL_USE: NO
EVER_SMOKED: NEVER

## 2017-11-09 ASSESSMENT — PATIENT HEALTH QUESTIONNAIRE - PHQ9
SUM OF ALL RESPONSES TO PHQ9 QUESTIONS 1 AND 2: 0
SUM OF ALL RESPONSES TO PHQ QUESTIONS 1-9: 0
2. FEELING DOWN, DEPRESSED, IRRITABLE, OR HOPELESS: NOT AT ALL
1. LITTLE INTEREST OR PLEASURE IN DOING THINGS: NOT AT ALL

## 2017-11-09 ASSESSMENT — COPD QUESTIONNAIRES
HAVE YOU SMOKED AT LEAST 100 CIGARETTES IN YOUR ENTIRE LIFE: NO/DON'T KNOW
DURING THE PAST 4 WEEKS HOW MUCH DID YOU FEEL SHORT OF BREATH: NONE/LITTLE OF THE TIME
COPD SCREENING SCORE: 0
DO YOU EVER COUGH UP ANY MUCUS OR PHLEGM?: NO/ONLY WITH OCCASIONAL COLDS OR INFECTIONS

## 2017-11-09 NOTE — PROGRESS NOTES
", due , 39.5 weeks  Pt presents to L&D with c/o irregular UCs & vaginal pressure/pain, \"feels like it's going to fall out\". Denies LOF, +spotting, +FM. EFM/[place] placed. VSS. SVE by RN, -/-1, anterior.  0726: Dr. Saxena called, message left.  0800: Dr. Saxena at bedside. Orders received for admission.  0815: Report given to G. Lombardi, RN. POC discussed.  "

## 2017-11-09 NOTE — PROGRESS NOTES
0815  Report received from Reema EAGLE.  Pt of Dr Saxena, , PETER 17, 39 5/7 today.  Per report, Pt 5-6cm at this time, infrequent mild UC, orders received for pitocin.  Pt transferred to labor room, POC discussed, all questions answered, Pt states understanding and agreeable to plan  1105  SROM clear fluid  1115  SVE no change from previous exam, Dr Saxena called with update  1300  SVE no change, call received from Dr Saxena, update given, orders received to continue to increase pitocin  1355  Pt states she is feeling a lot of pressure, SVE 7-8//-1, Dr Saxena called with update, no new orders received.  1517  Pt states she is feeling more pressure, SVE /-1, call received from Dr Saxena, update given, no new orders recieved  1555  Pt states she feels like she needs to push, SVE ant lip/-1  1633  SVE rim/0  1710  SVE complete/0, Dr Saxena called with update, ok to start pushing  1730  Pushing with Pt  1820  Dr Saxena at bedside to evaluate, SVE no change.  POC discussed with Pt, decision made to get epidural and labor down, Pt agreeable to plan  1900  Report given to Bia EAGLE

## 2017-11-10 LAB
ERYTHROCYTE [DISTWIDTH] IN BLOOD BY AUTOMATED COUNT: 45.6 FL (ref 35.9–50)
HCT VFR BLD AUTO: 30.7 % (ref 37–47)
HGB BLD-MCNC: 10.6 G/DL (ref 12–16)
MCH RBC QN AUTO: 30.7 PG (ref 27–33)
MCHC RBC AUTO-ENTMCNC: 34.5 G/DL (ref 33.6–35)
MCV RBC AUTO: 89 FL (ref 81.4–97.8)
PLATELET # BLD AUTO: 214 K/UL (ref 164–446)
PMV BLD AUTO: 11.1 FL (ref 9–12.9)
RBC # BLD AUTO: 3.45 M/UL (ref 4.2–5.4)
WBC # BLD AUTO: 19.5 K/UL (ref 4.8–10.8)

## 2017-11-10 PROCEDURE — 700112 HCHG RX REV CODE 229: Performed by: OBSTETRICS & GYNECOLOGY

## 2017-11-10 PROCEDURE — 700111 HCHG RX REV CODE 636 W/ 250 OVERRIDE (IP): Performed by: OBSTETRICS & GYNECOLOGY

## 2017-11-10 PROCEDURE — 59409 OBSTETRICAL CARE: CPT

## 2017-11-10 PROCEDURE — 85027 COMPLETE CBC AUTOMATED: CPT

## 2017-11-10 PROCEDURE — 303615 HCHG EPIDURAL/SPINAL ANESTHESIA FOR LABOR

## 2017-11-10 PROCEDURE — 36415 COLL VENOUS BLD VENIPUNCTURE: CPT

## 2017-11-10 PROCEDURE — 0KQM0ZZ REPAIR PERINEUM MUSCLE, OPEN APPROACH: ICD-10-PCS | Performed by: OBSTETRICS & GYNECOLOGY

## 2017-11-10 PROCEDURE — 304965 HCHG RECOVERY SERVICES

## 2017-11-10 PROCEDURE — 302151 K-PAD 14X20: Performed by: OBSTETRICS & GYNECOLOGY

## 2017-11-10 PROCEDURE — 302131 K PAD MOTOR: Performed by: OBSTETRICS & GYNECOLOGY

## 2017-11-10 PROCEDURE — 770002 HCHG ROOM/CARE - OB PRIVATE (112)

## 2017-11-10 PROCEDURE — 4A1HXCZ MONITORING OF PRODUCTS OF CONCEPTION, CARDIAC RATE, EXTERNAL APPROACH: ICD-10-PCS | Performed by: OBSTETRICS & GYNECOLOGY

## 2017-11-10 PROCEDURE — A9270 NON-COVERED ITEM OR SERVICE: HCPCS | Performed by: OBSTETRICS & GYNECOLOGY

## 2017-11-10 RX ORDER — VITAMIN A ACETATE, BETA CAROTENE, ASCORBIC ACID, CHOLECALCIFEROL, .ALPHA.-TOCOPHEROL ACETATE, DL-, THIAMINE MONONITRATE, RIBOFLAVIN, NIACINAMIDE, PYRIDOXINE HYDROCHLORIDE, FOLIC ACID, CYANOCOBALAMIN, CALCIUM CARBONATE, FERROUS FUMARATE, ZINC OXIDE, CUPRIC OXIDE 3080; 12; 120; 400; 1; 1.84; 3; 20; 22; 920; 25; 200; 27; 10; 2 [IU]/1; UG/1; MG/1; [IU]/1; MG/1; MG/1; MG/1; MG/1; MG/1; [IU]/1; MG/1; MG/1; MG/1; MG/1; MG/1
1 TABLET, FILM COATED ORAL EVERY MORNING
Status: DISCONTINUED | OUTPATIENT
Start: 2017-11-10 | End: 2017-11-12 | Stop reason: HOSPADM

## 2017-11-10 RX ORDER — IBUPROFEN 600 MG/1
600 TABLET ORAL EVERY 6 HOURS PRN
Status: DISCONTINUED | OUTPATIENT
Start: 2017-11-10 | End: 2017-11-12 | Stop reason: HOSPADM

## 2017-11-10 RX ORDER — OXYCODONE HYDROCHLORIDE AND ACETAMINOPHEN 5; 325 MG/1; MG/1
1 TABLET ORAL EVERY 4 HOURS PRN
Status: DISCONTINUED | OUTPATIENT
Start: 2017-11-10 | End: 2017-11-12 | Stop reason: HOSPADM

## 2017-11-10 RX ORDER — OXYCODONE AND ACETAMINOPHEN 10; 325 MG/1; MG/1
1 TABLET ORAL EVERY 4 HOURS PRN
Status: DISCONTINUED | OUTPATIENT
Start: 2017-11-10 | End: 2017-11-12 | Stop reason: HOSPADM

## 2017-11-10 RX ORDER — BISACODYL 10 MG
10 SUPPOSITORY, RECTAL RECTAL PRN
Status: DISCONTINUED | OUTPATIENT
Start: 2017-11-10 | End: 2017-11-12 | Stop reason: HOSPADM

## 2017-11-10 RX ORDER — SODIUM CHLORIDE, SODIUM LACTATE, POTASSIUM CHLORIDE, CALCIUM CHLORIDE 600; 310; 30; 20 MG/100ML; MG/100ML; MG/100ML; MG/100ML
INJECTION, SOLUTION INTRAVENOUS PRN
Status: DISCONTINUED | OUTPATIENT
Start: 2017-11-10 | End: 2017-11-12 | Stop reason: HOSPADM

## 2017-11-10 RX ORDER — DOCUSATE SODIUM 100 MG/1
100 CAPSULE, LIQUID FILLED ORAL 2 TIMES DAILY PRN
Status: DISCONTINUED | OUTPATIENT
Start: 2017-11-10 | End: 2017-11-12 | Stop reason: HOSPADM

## 2017-11-10 RX ORDER — MISOPROSTOL 200 UG/1
800 TABLET ORAL
Status: DISCONTINUED | OUTPATIENT
Start: 2017-11-10 | End: 2017-11-12 | Stop reason: HOSPADM

## 2017-11-10 RX ADMIN — Medication 125 ML/HR: at 03:26

## 2017-11-10 RX ADMIN — DOCUSATE SODIUM 100 MG: 100 CAPSULE ORAL at 19:30

## 2017-11-10 RX ADMIN — Medication 2000 ML/HR: at 02:40

## 2017-11-10 ASSESSMENT — PAIN SCALES - GENERAL
PAINLEVEL_OUTOF10: 1
PAINLEVEL_OUTOF10: 0
PAINLEVEL_OUTOF10: 1
PAINLEVEL_OUTOF10: 1
PAINLEVEL_OUTOF10: 2
PAINLEVEL_OUTOF10: 1
PAINLEVEL_OUTOF10: 2
PAINLEVEL_OUTOF10: 1
PAINLEVEL_OUTOF10: 3

## 2017-11-10 NOTE — PROGRESS NOTES
DATE OF SERVICE:  2017    Patient presented in labor, 6 cm.  She ruptured spontaneously.  She progressed   to complete, she started pushing about an hour and a half.  The head is very   high, -2 station.  She had no epidural, was uncomfortable, not urge to push.    On evaluation, the head is still high.  I recommended that she stop pushing,   receive an epidural, she can get passive descent.  Otherwise, she is going to   exhaust herself, and a  would be more likely.  Patient agreed to that   plan.  Fetal heart tones are reactive with some early's and mild variables.    The variability is good.       ____________________________________     MD EDUARDO BACA / NTS    DD:  2017 18:42:13  DT:  11/10/2017 02:42:03    D#:  [id number]  Job#:  [id number]

## 2017-11-10 NOTE — PROGRESS NOTES
0700- Report received from FCO Benavidez.  Patient sleeping.  FOB sleeping at bedside.  0730- Patient assessment done.  Patient stated that she is voiding without difficulty and passing flatus.  Patient up to bathroom and voided without difficulty.  Patient instructed on use of perineal cold packs, dermoplast spray, and witch hazel pads.  Patient verbalized understanding.  Patient denied dizziness and stated that she is walking without difficulty.  Discussed pain management plan and patient prefers to call for pain intervention as needed.  Reviewed plan of care.

## 2017-11-10 NOTE — CONSULTS
"Mother reports baby breast fed well after delivery. Teaching on hand expression (unable to express colostrum), importance of skin to skin, early & late hunger cues, given info on watching [employer] video (parents have watched [employer] video's on breastfeeding). Parents have [facility], getting personal pump from [facility] today and plan to follow up with 1:1 lactation consults through [facility]. \"Breastfeeding Essentials\" provided with review. Assisted baby to left breast, skin to skin, using cross cradle hold, no latch observed, baby too sleepy. Encouraged mother to keep baby skin to skin and encourage feed in 15-30 minutes. Mother to call for any further assistance.   "

## 2017-11-10 NOTE — PROGRESS NOTES
0545: Pt and FOB arrived from L&D via wheelchair, bedside report received from RN. Armbands verified. Pt assessed, fundus firm, lochia light, VSS. Moderate to severe perineal edema, ice pack/tucks/spray in use. Pt was able to void without difficult. Oriented to room, call light, emergency light, Skylight. Educated pt on feeding infant Q 2-3 hours, recording I&Os, and safe sleeping. POC discussed, questions answered, call light within reach.

## 2017-11-10 NOTE — PROGRESS NOTES
"Department of Obstetrics and Gynecology  Labor and Delivery Progress Note    S: Comfy with epidural    O: /69   Pulse 97   Temp 37.1 °C (98.7 °F)   Resp 16   Ht 1.676 m (5' 6\")   Wt 95.3 kg (210 lb)   BMI 33.89 kg/m²    FHT: 140/mod monico/+accels, +intermittent deep decelerations with decelerations which have resolved with pushing  [place]: q2-3min  SVE: complete, +2, asynclitic with sag suture to pt right    A/P: Brooke Thomas is a 27 y.o.  at 39+5.  AVSS.  Cat II FHT.  *Labor: labored down for 2h.  Will start pushing now  *FHT: now reassuring with CTX.  Amnio infusion.  CEFM.  *Pain: epidural providing good relief.    *GBS negative    Glenn Castro M.D., 2017, 10:24 PM     "

## 2017-11-10 NOTE — PROGRESS NOTES
; EDC ; EGA 39.5     - Report from G. Lombardi, RN. Pt in bed, breathing through UCs. Family at bedside. Pt bolusing for epidural. POC discussed, questions answered.    - Dr. Slaughter at bedside to place epidural.    - Test dose given, pt tolerated well.    - Dr. Saxena at bedside. IUPC placed. SVE unchanged.    - Rand placed, pt tolerated well.    - Dr. Castro at bedside. SVE C/+2. OK for pt to continue to labor down   - Dr. Castro updated regarding FHT decelerations. Order for amnioinfusion received.    - Pushing with RN guidance.    - Dr. Castro at bedside to assess pt. Ultrasound brought to bedside, infant in OA position. Pt desires to continue pushing  0150 - Dr. Castro at bedside to assess pt  0235 -  ov viable male infant, 8/9 APGARs. 70 second should dystocia  0400 - Pt unsteady on Feet. + void on bedpan  0530 - Pt transferred to [facility] via wheelchair in stable condition with infant in arms, FOB at side with belongings. Report to FCO Benavidez

## 2017-11-10 NOTE — H&P
CHIEF COMPLAINTS:  Patient presents in labor.    HISTORY OF PRESENT ILLNESS:  Patient is a 27-year-old female  2, para   0, whose estimated due date is  by last menstrual period   consistent with early ultrasound.  She presented in labor at 6 cm.    PAST MEDICAL HISTORY:  Benign.    PAST SURGICAL HISTORY:  Include eustachian tubes, septoplasty, and wisdom   teeth.    ALLERGIES:  AMOXICILLIN.    HABITS:  Patient denies tobacco or drug use.    OBSTETRICAL HISTORY:  Care has been uncomplicated.  She is group B strep   negative, O positive, and rubella immune.    PHYSICAL EXAMINATION:  VITAL SIGNS:  Normal.  GENERAL:  Patient is uncomfortable with labor.  HEENT:  Atraumatic, normocephalic.  ABDOMEN:  Gravid.  PELVIC:  Cervix was 6 cm, vertex per RN.  EXTREMITIES:  Have 1+ edema.    ASSESSMENT:  1.  Term pregnancy.  2.  Labor.    PLAN:  We will manage expectantly.  We are going to augment with Pitocin.    Pros and cons have been discussed with patient.       ____________________________________     MD EDUARDO BACA / NTS    DD:  2017 17:22:24  DT:  2017 18:40:13    D#:  [id number]  Job#:  [id number]

## 2017-11-10 NOTE — DELIVERY NOTE
DATE OF SERVICE:  11/10/2017    PROCEDURE:  Spontaneous vaginal delivery.    PRIMARY OBSTETRICIAN:  Vianney Saxena MD    DELIVERING OBSTETRICIAN:  Glenn Castro MD    PREPROCEDURE DIAGNOSES:  1.  A 27-year-old  with intrauterine pregnancy at 39 weeks 6 days   gestational age.  2.  Spontaneous labor.  3.  Protracted second stage of labor.  4.  A 70-second shoulder dystocia.    POSTPROCEDURE DIAGNOSES:  1.  A 27-year-old  with intrauterine pregnancy at 39 weeks 6 days   gestational age.  2.  Spontaneous labor.  3.  Protracted second stage of labor.  4.  A 70-second shoulder dystocia.  5.  Postpartum status post spontaneous vaginal delivery with  resolution of   shoulder dystocia.    ANESTHESIA:  Epidural.    ANESTHESIOLOGIST:  Jay Slaughter MD    FINDINGS:  1.  Viable male infant in direct OA position delivered at 0235 hours on   11/10/2017.  2.  Weight 3665 g (8 pounds 1.3 ounces).  3.  Apgars 8 at one minute and 9 at five minutes.  4.  Clear amniotic fluid.  5.  Intact, normal-appearing placenta with 3-vessel cord and central cord   insertion.  6.  Second-degree perineal laceration.  7.  Umbilical cord gases, arterial pH 7.33, pCO2 38.9, pO2 19.6, bicarb   20, base excess -5.  Venous pH 7.42, pCO2 25.3, pO2 28.3, bicarb 16, base   excess -6.    ESTIMATED BLOOD LOSS:  350 mL.    NARRATIVE:  This 27-year-old G2, P0 with intrauterine pregnancy at 36 weeks   and 6 days gestational age on date of delivery, presented the morning prior   with complaints of contractions and was found to be 6 cm dilated.  She ended   up being augmented with oxytocin and achieved complete dilation at 1710 hours.    The patient then pushed for approximately 1 hour, which was not coordinated   and did not show any descent of the baby.  At this point, she had not received   an epidural and Dr. Saxena recommended that she moved forward with it.  An   epidural was placed and the patient allowed to descend for approximately 2    hours.  The patient then began pushing.  This second session of pushing lasted   4-1/2 hours, during which she continued to make gradual, slow, but steady   progress throughout.  I took over the patient's care around that time she was   receiving her epidural.  At the time of delivery, the fetal head delivered   first in OA position.  It was noted at this point to have the turtle sign.    The head initially rested to did with the orbits to the maternal right.  The   head was guided out gently without any traction whatsoever.  At this point, we   have the patient attempted to push for the anterior shoulder, which showed   some good movement.  However, the patient had stopped pushing effectively at   this point, we then have the patient pushed once more and again she gave a   short push.  The patient's head was brought back and legs were placed in an   exaggerated flexion position for [place] positioning.  We then have the   patient push again for the anterior shoulder and began to see movement   outward; however, it did not completely delivered during this push.  The   patient pushed once more and still did not deliver.  At this point, the fetal   back was palpated and found to be with the spine at approximately 1 o'clock   position already.  As such, I reached for the anterior, right shoulder and   hooked in index finger behind the axilla and have the patient push with the   next contraction for delivery of the anterior arm.  This was able to deliver   and was followed quickly thereafter by the remainder of the infant.  The   infant had decreased tone at the time of delivery and as such, the cord was   doubly clamped, and cut and the infant was handed off to the waiting pediatric   resuscitation team.  A section of cord was taken for cord gases.  The   placenta then delivered quickly thereafter and was noted to be intact as noted   above.  Gentle traction on the cord and application of oxytocin at postpartum    dosing were used for active management of the third stage of labor.  A survey   of the patient's perineum, vulva and vagina revealed the above findings.  A   second-degree perineal laceration was identified and repaired in the usual   running fashion with 3-0 Vicryl.  At the end of the repair, the edges were   hemostatic and well reapproximated.  The patient tolerated the procedure well.    Shoulder dystocia complicated the procedure as noted above.  Sponge and   needle counts were correct at the end of procedure.  The patient and her   infant remained in her birthing room before later transfer to maternity.  Dr. Castro was present throughout and performed the entire procedure.    COMPLICATIONS:  Shoulder dystocia.    CONDITION:  Good.    DISPOSITION:  Birthing room and then maternity.         ____________________________________     MD CRISTOBAL Del Cid / TOSHA    DD:  11/10/2017 03:40:48  DT:  11/10/2017 11:44:52    D#:  [id number]  Job#:  [id number]

## 2017-11-11 PROCEDURE — 700112 HCHG RX REV CODE 229: Performed by: OBSTETRICS & GYNECOLOGY

## 2017-11-11 PROCEDURE — A9270 NON-COVERED ITEM OR SERVICE: HCPCS | Performed by: OBSTETRICS & GYNECOLOGY

## 2017-11-11 PROCEDURE — 700102 HCHG RX REV CODE 250 W/ 637 OVERRIDE(OP): Performed by: OBSTETRICS & GYNECOLOGY

## 2017-11-11 PROCEDURE — 770002 HCHG ROOM/CARE - OB PRIVATE (112)

## 2017-11-11 RX ADMIN — DOCUSATE SODIUM 100 MG: 100 CAPSULE ORAL at 08:42

## 2017-11-11 RX ADMIN — Medication 1 TABLET: at 08:38

## 2017-11-11 ASSESSMENT — PAIN SCALES - GENERAL
PAINLEVEL_OUTOF10: 2
PAINLEVEL_OUTOF10: 2
PAINLEVEL_OUTOF10: 0

## 2017-11-11 ASSESSMENT — LIFESTYLE VARIABLES: DO YOU DRINK ALCOHOL: NO

## 2017-11-11 NOTE — PROGRESS NOTES
Mother reports difficulty latching infant onto left breast. Reviewed hand expression and roused infant with colostrum to lips and skin to skin contact. Achieved and sustained pain-free latch on left breast in football position. Reviewed expected course of lactation with parents and proper postioning and latch technique. Will follow-up tomorrow before discharge.

## 2017-11-11 NOTE — PROGRESS NOTES
"OB Post Partum  Note    Doing well overnight. Ambulating. Mild cramping. Scant lochia. Breast feeding. Eating and voiding without difficulty    /79   Pulse 100   Temp 36.8 °C (98.3 °F)   Resp 16   Ht 1.676 m (5' 6\")   Wt 95.3 kg (210 lb)   SpO2 100%   BMI 33.89 kg/m²     Gen: NAD, resting comfortably  GI: soft, non tender to palpation  : fundus firm and below umbilicus  Ext: no edema    Recent Labs      17   0805  11/10/17   1100   WBC  12.5*  19.5*   RBC  4.17*  3.45*   HEMOGLOBIN  12.3  10.6*   HEMATOCRIT  36.7*  30.7*   MCV  88.0  89.0   MCH  29.5  30.7   RDW  45.0  45.6   PLATELETCT  253  214   MPV  11.1  11.1   NEUTSPOLYS  73.30*   --    LYMPHOCYTES  16.40*   --    MONOCYTES  7.00   --    EOSINOPHILS  1.20   --    BASOPHILS  0.30   --        Assessment:  Post partum day #1 s/p  with shoulder dystocia    Plan:  Routine post partum care  Anticipate discharge on post partum day 2    Colleen Jackson M.D.    "

## 2017-11-12 VITALS
OXYGEN SATURATION: 96 % | WEIGHT: 210 LBS | HEIGHT: 66 IN | SYSTOLIC BLOOD PRESSURE: 124 MMHG | TEMPERATURE: 98.7 F | RESPIRATION RATE: 18 BRPM | DIASTOLIC BLOOD PRESSURE: 77 MMHG | BODY MASS INDEX: 33.75 KG/M2 | HEART RATE: 93 BPM

## 2017-11-12 PROCEDURE — A9270 NON-COVERED ITEM OR SERVICE: HCPCS | Performed by: OBSTETRICS & GYNECOLOGY

## 2017-11-12 PROCEDURE — 700102 HCHG RX REV CODE 250 W/ 637 OVERRIDE(OP): Performed by: OBSTETRICS & GYNECOLOGY

## 2017-11-12 RX ADMIN — Medication 1 TABLET: at 09:48

## 2017-11-12 ASSESSMENT — PAIN SCALES - GENERAL
PAINLEVEL_OUTOF10: 0
PAINLEVEL_OUTOF10: 2
PAINLEVEL_OUTOF10: 0

## 2017-11-12 ASSESSMENT — LIFESTYLE VARIABLES: EVER_SMOKED: NEVER

## 2017-11-12 NOTE — PROGRESS NOTES
Infant at 8.7% weight loss last night, RN encouraged Q2 hour feeds overnight. Re-weigh this morning infant down 10.7% and mother's milk has not yet increased in volume. Infant is sleepy at breast, visible jaundice but bilirubin in normal range per primary RN. Assisted mother to pump with Medela pump in style breast pump, removed 2 drops from left breast and 1ml from right breast. Taught paced bottle feeding to parents and FOB supplemented infant with donor breast milk per supplemental guidelines. Mother has already scheduled outpatient follow-up for tomorrow at [facility]. Plan overnight is to breastfeed first for 15-20 minutes then pump and supplement per guidelines every 3 hours, volumes reviewed with parents. Questions answered and support provided.

## 2017-11-12 NOTE — PROGRESS NOTES
Pt comfortable at this time, aware to call with any needs or concerns. Plan for discharge, mom to receive mmr prior to discharge

## 2017-11-12 NOTE — PROGRESS NOTES
Instructed patient and FOB to have car seat in room before discharge. Instructed to watch mom and baby videos and discharge handout given with instructions to read and call for questions. Questions answered regarding car seat,jaundice,breast feeding, bulb syring use and care.

## 2017-11-12 NOTE — DISCHARGE INSTRUCTIONS
POSTPARTUM DISCHARGE INSTRUCTIONS FOR MOM    YOB: 1990   Age: 27 y.o.               Admit Date: 2017     Discharge Date: 2017  Attending Doctor:  Vianney Saxena M.D.                  Allergies:  Food; Amoxicillin; and Pcn [penicillins]    Discharged to home by car. Discharged via wheelchair, hospital escort: Yes.  Special equipment needed: Not Applicable  Belongings with: PersonalPOSTPARTUM DISCHARGE INSTRUCTIONS FOR MOM    YOB: 1990   Age: 27 y.o.               Admit Date: 2017     Discharge Date: 2017  Attending Doctor:  Vianney Saxena M.D.                  Allergies:  Food; Amoxicillin; and Pcn [penicillins]    Discharged to home by car. Discharged via wheelchair, hospital escort: Yes.  Special equipment needed: Not Applicable  Belongings with: Personal  Be sure to schedule a follow-up appointment with your primary care doctor or any specialists as instructed.     Discharge Plan:   Diet Plan: Discussed  Activity Level: Discussed  Confirmed Follow up Appointment: Patient to Call and Schedule Appointment  Influenza Vaccine Indication: Not indicated: Previously immunized this influenza season and > 8 years of age    REASONS TO CALL YOUR OBSTETRICIAN:  1.   Persistent fever or shaking chills (Temperature higher than 100.4)  2.   Heavy bleeding (soaking more than 1 pad per hour); Passing clots  3.   Foul odor from vagina  4.   Mastitis (Breast infection; breast pain, chills, fever, redness)  5.   Urinary pain, burning or frequency  6.   Episiotomy infection  7.   Abdominal incision infection  8.   Severe depression longer than 24 hours    HAND WASHING  Prior to handling the baby.  Before breastfeeding or bottle feeding baby.  After using the bathroom or changing the baby's diaper.    WOUND CARE  Ask your physician for additional care instructions.  In general:     Incision:      Keep clean and dry.    Do NOT lift anything heavier than your baby for up to 6 weeks.   "  There should not be any opening or pus.      VAGINAL CARE  Nothing inside vagina for 6 weeks: no sexual intercourse, tampons or douching.  Bleeding may continue for 2-4 weeks.  Amount may vary.    Call your physician for heavy bleeding which means soaking more than 1 pad per hour    BIRTH CONTROL  It is possible to become pregnant at any time after delivery and while breastfeeding.  Plan to discuss a method of birth control with your physician at your follow up visit. visit.    DIET AND ELIMINATION  Eating more fiber (bran cereal, fruits, and vegetables) and drinking plenty of fluids will help to avoid constipation.  Urinary frequency after childbirth is normal.    POSTPARTUM BLUES  During the first few days after birth, you may experience a sense of the \"blues\" which may include impatience, irritability or even crying.  These feeling come and go quickly.  However, as many as 1 in 10 women experience emotional symptoms known as postpartum depression.    Postpartum depression:  May start as early as the second or third day after delivery or take several weeks or months to develop.  Symptoms of \"blues\" are present, but are more intense:  Crying spells; loss of appetite; feelings of hopelessness or loss of control; fear of touching the baby; over concern or no concern at all about the baby; little or no concern about your own appearance/caring for yourself; and/or inability to sleep or excessive sleeping.  Contact your physician if you are experiencing any of these symptoms.    Crisis Hotline:  [place] Crisis Hotline:  6-965-KXEVEQH  Or 1-415.538.1302  [place] Crisis Hotline:  1-477.681.4895  Or 532-942-8869    PREVENTING SHAKEN BABY:  If you are angry or stressed, PUT THE BABY IN THE CRIB, step away, take some deep breaths, and wait until you are calm to care for the baby.  DO NOT SHAKE THE BABY.  You are not alone, call a supporter for help.    Crisis Call Center 24/7 crisis line 550-641-1920 or 1-832.313.7928  You " "can also text them, text \"ANSWER\" to 448544    QUIT SMOKING/TOBACCO USE:  I understand the use of any tobacco products increases my chance of suffering from future heart disease and could cause other illnesses which may shorten my life. Quitting the use of tobacco products is the single most important thing I can do to improve my health. For further information on smoking / tobacco cessation call a Toll Free Quit Line at 1-646.621.6472 (*National Cancer [place]) or 1-754.984.9421 (American Lung Association) or you can access the web based program at www.lungusa.org.    [place] Tobacco Users Help Line:  (645) 664-1337       Toll Free: 1-733.548.8462  Quit Tobacco Program [facility] (195)111-1482    DEPRESSION / SUICIDE RISK:  As you are discharged from this [facility], it is important to learn how to keep safe from harming yourself.    Recognize the warning signs:  Abrupt changes in personality, positive or negative- including increase in energy   Giving away possessions  Change in eating patterns- significant weight changes-  positive or negative  Change in sleeping patterns- unable to sleep or sleeping all the time   Unwillingness or inability to communicate  Depression  Unusual sadness, discouragement and loneliness  Talk of wanting to die  Neglect of personal appearance   Rebelliousness- reckless behavior  Withdrawal from people/activities they love  Confusion- inability to concentrate     If you or a loved one observes any of these behaviors or has concerns about self-harm, here's what you can do:  Talk about it- your feelings and reasons for harming yourself  Remove any means that you might use to hurt yourself (examples: pills, rope, extension cords, firearm)  Get professional help from the community (Mental Health, Substance Abuse, psychological counseling)  Do not be alone:Call your Safe Contact- someone whom you trust who will be there for you.  Call your local CRISIS " HOTLINE 728-9876 or 441-925-5267  Call your local Children's Mobile Crisis Response Team [place] (378) 768-4631 or www.[employer]  Call the toll free National Suicide Prevention Hotlines   National Suicide Prevention Lifeline 169-787-WWKW (0804)  National Hope Line Network 800-SUICIDE (026-7818)    DISCHARGE SURVEY:  Thank you for choosing [facility].  We hope we provided you with very good care.  You may be receiving a survey in the mail.  Please fill it out.  Your opinion is valuable to us.    ADDITIONAL EDUCATIONAL MATERIALS GIVEN TO PATIENT:        My signature on this form indicates that:  1.  I have reviewed and understand the above information  2.  My questions regarding this information have been answered to my satisfaction.  3.  I have formulated a plan with my discharge nurse to obtain my prescribed medication for home.    Be sure to schedule a follow-up appointment with your primary care doctor or any specialists as instructed.     Discharge Plan:   Diet Plan: Discussed  Activity Level: Discussed  Confirmed Follow up Appointment: Patient to Call and Schedule Appointment  Influenza Vaccine Indication: Not indicated: Previously immunized this influenza season and > 8 years of age    REASONS TO CALL YOUR OBSTETRICIAN:  1.   Persistent fever or shaking chills (Temperature higher than 100.4)  2.   Heavy bleeding (soaking more than 1 pad per hour); Passing clots  3.   Foul odor from vagina  4.   Mastitis (Breast infection; breast pain, chills, fever, redness)  5.   Urinary pain, burning or frequency  6.   Episiotomy infection  7.   Abdominal incision infection  8.   Severe depression longer than 24 hours    HAND WASHING  · Prior to handling the baby.  · Before breastfeeding or bottle feeding baby.  · After using the bathroom or changing the baby's diaper.    WOUND CARE  Ask your physician for additional care instructions.  In general:    ·  Incision:      · Keep clean and dry.    · Do  "NOT lift anything heavier than your baby for up to 6 weeks.    · There should not be any opening or pus.      VAGINAL CARE  · Nothing inside vagina for 6 weeks: no sexual intercourse, tampons or douching.  · Bleeding may continue for 2-4 weeks.  Amount may vary.    · Call your physician for heavy bleeding which means soaking more than 1 pad per hour    BIRTH CONTROL  · It is possible to become pregnant at any time after delivery and while breastfeeding.  · Plan to discuss a method of birth control with your physician at your follow up visit. visit.    DIET AND ELIMINATION  · Eating more fiber (bran cereal, fruits, and vegetables) and drinking plenty of fluids will help to avoid constipation.  · Urinary frequency after childbirth is normal.    POSTPARTUM BLUES  During the first few days after birth, you may experience a sense of the \"blues\" which may include impatience, irritability or even crying.  These feeling come and go quickly.  However, as many as 1 in 10 women experience emotional symptoms known as postpartum depression.    Postpartum depression:  May start as early as the second or third day after delivery or take several weeks or months to develop.  Symptoms of \"blues\" are present, but are more intense:  Crying spells; loss of appetite; feelings of hopelessness or loss of control; fear of touching the baby; over concern or no concern at all about the baby; little or no concern about your own appearance/caring for yourself; and/or inability to sleep or excessive sleeping.  Contact your physician if you are experiencing any of these symptoms.    Crisis Hotline:  · [place] Crisis Hotline:  8-223-ZUPPOXX  Or 1-465.929.9532  · [place] Crisis Hotline:  1-844.927.4964  Or 863-480-0734    PREVENTING SHAKEN BABY:  If you are angry or stressed, PUT THE BABY IN THE CRIB, step away, take some deep breaths, and wait until you are calm to care for the baby.  DO NOT SHAKE THE BABY.  You are not alone, call a supporter for " "help.    · Crisis Call Center 24/7 crisis line 131-458-3382 or 1-945.813.6985  · You can also text them, text \"ANSWER\" to 889390    QUIT SMOKING/TOBACCO USE:  I understand the use of any tobacco products increases my chance of suffering from future heart disease and could cause other illnesses which may shorten my life. Quitting the use of tobacco products is the single most important thing I can do to improve my health. For further information on smoking / tobacco cessation call a Toll Free Quit Line at 1-740.150.6190 (*National Cancer [place]) or 1-493.263.9625 (American Lung Association) or you can access the web based program at www.lungusa.org.    · [place] Tobacco Users Help Line:  (414) 263-7628       Toll Free: 1-429.911.3097  · Quit Tobacco Program [facility] (541)268-8865    DEPRESSION / SUICIDE RISK:  As you are discharged from this [facility], it is important to learn how to keep safe from harming yourself.    Recognize the warning signs:  · Abrupt changes in personality, positive or negative- including increase in energy   · Giving away possessions  · Change in eating patterns- significant weight changes-  positive or negative  · Change in sleeping patterns- unable to sleep or sleeping all the time   · Unwillingness or inability to communicate  · Depression  · Unusual sadness, discouragement and loneliness  · Talk of wanting to die  · Neglect of personal appearance   · Rebelliousness- reckless behavior  · Withdrawal from people/activities they love  · Confusion- inability to concentrate     If you or a loved one observes any of these behaviors or has concerns about self-harm, here's what you can do:  · Talk about it- your feelings and reasons for harming yourself  · Remove any means that you might use to hurt yourself (examples: pills, rope, extension cords, firearm)  · Get professional help from the community (Mental Health, Substance Abuse, psychological " counseling)  · Do not be alone:Call your Safe Contact- someone whom you trust who will be there for you.  · Call your local CRISIS HOTLINE 428-7771 or 463-703-1783  · Call your local Children's Mobile Crisis Response Team [place] (621) 617-0849 or www.[employer]  · Call the toll free National Suicide Prevention Hotlines   · National Suicide Prevention Lifeline 662-218-ZDLN (0845)  · National Hope Line Network 800-SUICIDE (827-6969)    DISCHARGE SURVEY:  Thank you for choosing [facility].  We hope we provided you with very good care.  You may be receiving a survey in the mail.  Please fill it out.  Your opinion is valuable to us.    ADDITIONAL EDUCATIONAL MATERIALS GIVEN TO PATIENT:        My signature on this form indicates that:  1.  I have reviewed and understand the above information  2.  My questions regarding this information have been answered to my satisfaction.  3.  I have formulated a plan with my discharge nurse to obtain my prescribed medication for home.

## 2017-11-13 NOTE — DISCHARGE SUMMARY
DATE OF ADMISSION:  11/09/2017    DATE OF DISCHARGE:  11/12/2017    ADMISSION DIAGNOSES:  1.  A 27-year-old G2, P0, at 39 weeks and 5 days.  2.  Labor.  3.  Group B streptococcus negative.    DISCHARGE DIAGNOSES:  1.  A 27-year-old G2, P0 with intrauterine pregnancy at 39 weeks and 6 days.  2.  Spontaneous labor.  3.  Protected second stage of labor.  4.  70-second shoulder dystocia.  5.  Postpartum, status post spontaneous vaginal delivery with resolution of   shoulder dystocia.    HOSPITAL COURSE:  Patient was admitted back by Dr. Saxena.  She was signed out   to Dr. Castro, who managed her care and went on to perform her delivery.  This   is complicated by 70-second shoulder dystocia.  Baby's Apgars were 8 and 9.    Baby weighed 8 pounds 1 ounce.  Mom was transitioned to the postpartum unit   where she has overall done well.  She is postpartum day #2.  Today, she is   ambulating, tolerating regular diet, has good control of her pain with oral   pain medications and has return of bowel function.  She is working on   breastfeeding.  Baby is doing well and they will both be discharged home   together.  I reviewed warning signs and precautions when to follow up with Dr. Saxena in 6 weeks, she voiced her understanding.  She declined any postpartum   medications on discharge.       ____________________________________     MD CHRIS Dotson / TOSHA    DD:  11/12/2017 13:01:15  DT:  11/13/2017 07:24:05    D#:  [id number]  Job#:  [id number]

## 2021-01-05 ENCOUNTER — HOSPITAL ENCOUNTER (OUTPATIENT)
Dept: LAB | Facility: MEDICAL CENTER | Age: 31
End: 2021-01-05
Payer: COMMERCIAL

## 2021-01-05 PROCEDURE — U0003 INFECTIOUS AGENT DETECTION BY NUCLEIC ACID (DNA OR RNA); SEVERE ACUTE RESPIRATORY SYNDROME CORONAVIRUS 2 (SARS-COV-2) (CORONAVIRUS DISEASE [COVID-19]), AMPLIFIED PROBE TECHNIQUE, MAKING USE OF HIGH THROUGHPUT TECHNOLOGIES AS DESCRIBED BY CMS-2020-01-R: HCPCS

## 2021-01-05 PROCEDURE — U0005 INFEC AGEN DETEC AMPLI PROBE: HCPCS

## 2021-01-06 LAB
COVID ORDER STATUS COVID19: NORMAL
SARS-COV-2 RNA RESP QL NAA+PROBE: NOTDETECTED
SPECIMEN SOURCE: NORMAL

## 2021-01-27 NOTE — CARE PLAN
Problem: Alteration in comfort related to episiotomy, vaginal repair and/or after birth pains  Goal: Patient is able to ambulate, care for self and infant  Outcome: PROGRESSING AS EXPECTED  Ambulating  and voiding without difficulty.  Goal: Patient verbalizes acceptable pain level  Outcome: PROGRESSING AS EXPECTED  Denies pain most of the time. Patient will occasionally ask for ibuprofen.Verbalizes acceptable pain relief with pain medication being given as requested.    Problem: Potential knowledge deficit related to lack of understanding of self and  care  Goal: Patient will demonstrate ability to care for self and infant  Outcome: PROGRESSING AS EXPECTED  Patient breast feeding well with occasional assistance needed when infant is too fussy to latch. Bonding well with infant.       
Problem: Altered physiologic condition related to immediate post-delivery state and potential for bleeding/hemorrhage  Goal: Patient physiologically stable as evidenced by normal lochia, palpable uterine involution and vital signs within normal limits  Outcome: PROGRESSING AS EXPECTED  Fundus firm at umbilicus with light lochia.      
Problem: Communication  Goal: The ability to communicate needs accurately and effectively will improve  Outcome: PROGRESSING AS EXPECTED  Reviewed plan of care with patient who verbalized understanding.    Problem: Altered physiologic condition related to immediate post-delivery state and potential for bleeding/hemorrhage  Goal: Patient physiologically stable as evidenced by normal lochia, palpable uterine involution and vital signs within normal limits  Outcome: PROGRESSING AS EXPECTED  Fundus firm.  Lochia light to moderate.    Problem: Potential for postpartum infection related to presence of episiotomy/vaginal tear and/or uterine contamination  Goal: Patient will be absent from signs and symptoms of infection  Outcome: PROGRESSING AS EXPECTED  Patient is afebrile.  No signs or symptoms of infection      
Problem: Pain  Goal: Alleviation of Pain or a reduction in pain to the patient's comfort goal  Outcome: PROGRESSING AS EXPECTED  Options for pain management during labor and delivery discussed, all questions answered, Pt states understanding    Problem: Risk for Infection, Impaired Wound Healing  Goal: Remain free from signs and symptoms of infection  Outcome: PROGRESSING AS EXPECTED  Pt is afebrile at this time      
Problem: Potential anxiety related to difficulty adapting to parental role  Goal: Patient will verbalize and demonstrate effective bonding and parenting behavior    Intervention: Assess patient for anxiety or apprehension regarding parenting role  Patient very anxious about taking infant home. Asking many self care and infant care questions. Fob in room for education. Reassured with discharge paperwork and question and answers to topic such as circumcision care, latching infant, bathing, and diapering. Patient will discharge tomorrow. Patient wants to wait for mmr until discharge.         
Problem: Venous Thromboembolism (VTW)/Deep Vein Thrombosis (DVT) Prevention:  Goal: Patient will participate in Venous Thrombosis (VTE)/Deep Vein Thrombosis (DVT)Prevention Measures  Outcome: PROGRESSING AS EXPECTED  Pt ambulatory; no current s/s of DVT.    Problem: Alteration in comfort related to episiotomy, vaginal repair and/or after birth pains  Goal: Patient is able to ambulate, care for self and infant  Outcome: PROGRESSING AS EXPECTED  Pt able to perform ADLs and care for self and infant. Tucks, spray and ice in use. Pt states she wants to avoid pain medications and states she will call staff if she requires PRN pain medication.       
Additional Notes: Patient consent was obtained to proceed with the visit and recommended plan of care after discussion of all risks and benefits, including the risks of COVID-19 exposure.
Detail Level: Simple

## 2021-07-31 ENCOUNTER — ANESTHESIA EVENT (OUTPATIENT)
Dept: ANESTHESIOLOGY | Facility: MEDICAL CENTER | Age: 31
End: 2021-07-31
Payer: COMMERCIAL

## 2021-07-31 ENCOUNTER — HOSPITAL ENCOUNTER (INPATIENT)
Facility: MEDICAL CENTER | Age: 31
LOS: 2 days | End: 2021-08-02
Attending: OBSTETRICS & GYNECOLOGY | Admitting: OBSTETRICS & GYNECOLOGY
Payer: COMMERCIAL

## 2021-07-31 ENCOUNTER — ANESTHESIA (OUTPATIENT)
Dept: ANESTHESIOLOGY | Facility: MEDICAL CENTER | Age: 31
End: 2021-07-31
Payer: COMMERCIAL

## 2021-07-31 ENCOUNTER — APPOINTMENT (OUTPATIENT)
Dept: OBGYN | Facility: MEDICAL CENTER | Age: 31
End: 2021-07-31
Attending: OBSTETRICS & GYNECOLOGY
Payer: COMMERCIAL

## 2021-07-31 LAB
BASOPHILS # BLD AUTO: 0.2 % (ref 0–1.8)
BASOPHILS # BLD: 0.02 K/UL (ref 0–0.12)
EOSINOPHIL # BLD AUTO: 0.14 K/UL (ref 0–0.51)
EOSINOPHIL NFR BLD: 1.4 % (ref 0–6.9)
ERYTHROCYTE [DISTWIDTH] IN BLOOD BY AUTOMATED COUNT: 46 FL (ref 35.9–50)
HCT VFR BLD AUTO: 35.6 % (ref 37–47)
HGB BLD-MCNC: 11.6 G/DL (ref 12–16)
HOLDING TUBE BB 8507: NORMAL
IMM GRANULOCYTES # BLD AUTO: 0.13 K/UL (ref 0–0.11)
IMM GRANULOCYTES NFR BLD AUTO: 1.3 % (ref 0–0.9)
LYMPHOCYTES # BLD AUTO: 2.23 K/UL (ref 1–4.8)
LYMPHOCYTES NFR BLD: 22.8 % (ref 22–41)
MCH RBC QN AUTO: 28.8 PG (ref 27–33)
MCHC RBC AUTO-ENTMCNC: 32.6 G/DL (ref 33.6–35)
MCV RBC AUTO: 88.3 FL (ref 81.4–97.8)
MONOCYTES # BLD AUTO: 0.78 K/UL (ref 0–0.85)
MONOCYTES NFR BLD AUTO: 8 % (ref 0–13.4)
NEUTROPHILS # BLD AUTO: 6.46 K/UL (ref 2–7.15)
NEUTROPHILS NFR BLD: 66.3 % (ref 44–72)
NRBC # BLD AUTO: 0 K/UL
NRBC BLD-RTO: 0 /100 WBC
PLATELET # BLD AUTO: 216 K/UL (ref 164–446)
PMV BLD AUTO: 11 FL (ref 9–12.9)
RBC # BLD AUTO: 4.03 M/UL (ref 4.2–5.4)
WBC # BLD AUTO: 9.8 K/UL (ref 4.8–10.8)

## 2021-07-31 PROCEDURE — 700111 HCHG RX REV CODE 636 W/ 250 OVERRIDE (IP): Performed by: OBSTETRICS & GYNECOLOGY

## 2021-07-31 PROCEDURE — 304965 HCHG RECOVERY SERVICES

## 2021-07-31 PROCEDURE — 10907ZC DRAINAGE OF AMNIOTIC FLUID, THERAPEUTIC FROM PRODUCTS OF CONCEPTION, VIA NATURAL OR ARTIFICIAL OPENING: ICD-10-PCS | Performed by: OBSTETRICS & GYNECOLOGY

## 2021-07-31 PROCEDURE — 700101 HCHG RX REV CODE 250: Performed by: ANESTHESIOLOGY

## 2021-07-31 PROCEDURE — 700102 HCHG RX REV CODE 250 W/ 637 OVERRIDE(OP): Performed by: OBSTETRICS & GYNECOLOGY

## 2021-07-31 PROCEDURE — 770002 HCHG ROOM/CARE - OB PRIVATE (112)

## 2021-07-31 PROCEDURE — 3E033VJ INTRODUCTION OF OTHER HORMONE INTO PERIPHERAL VEIN, PERCUTANEOUS APPROACH: ICD-10-PCS | Performed by: OBSTETRICS & GYNECOLOGY

## 2021-07-31 PROCEDURE — 59409 OBSTETRICAL CARE: CPT

## 2021-07-31 PROCEDURE — 700111 HCHG RX REV CODE 636 W/ 250 OVERRIDE (IP)

## 2021-07-31 PROCEDURE — 0KQM0ZZ REPAIR PERINEUM MUSCLE, OPEN APPROACH: ICD-10-PCS | Performed by: OBSTETRICS & GYNECOLOGY

## 2021-07-31 PROCEDURE — 700105 HCHG RX REV CODE 258: Performed by: OBSTETRICS & GYNECOLOGY

## 2021-07-31 PROCEDURE — 10H07YZ INSERTION OF OTHER DEVICE INTO PRODUCTS OF CONCEPTION, VIA NATURAL OR ARTIFICIAL OPENING: ICD-10-PCS | Performed by: OBSTETRICS & GYNECOLOGY

## 2021-07-31 PROCEDURE — A9270 NON-COVERED ITEM OR SERVICE: HCPCS | Performed by: OBSTETRICS & GYNECOLOGY

## 2021-07-31 PROCEDURE — 85025 COMPLETE CBC W/AUTO DIFF WBC: CPT

## 2021-07-31 PROCEDURE — 700101 HCHG RX REV CODE 250

## 2021-07-31 PROCEDURE — 303615 HCHG EPIDURAL/SPINAL ANESTHESIA FOR LABOR

## 2021-07-31 RX ORDER — ACETAMINOPHEN 325 MG/1
650 TABLET ORAL EVERY 4 HOURS PRN
Status: DISCONTINUED | OUTPATIENT
Start: 2021-07-31 | End: 2021-08-02 | Stop reason: HOSPADM

## 2021-07-31 RX ORDER — LIDOCAINE HYDROCHLORIDE AND EPINEPHRINE 15; 5 MG/ML; UG/ML
INJECTION, SOLUTION EPIDURAL
Status: COMPLETED | OUTPATIENT
Start: 2021-07-31 | End: 2021-07-31

## 2021-07-31 RX ORDER — METHYLERGONOVINE MALEATE 0.2 MG/ML
0.2 INJECTION INTRAVENOUS
Status: DISCONTINUED | OUTPATIENT
Start: 2021-07-31 | End: 2021-08-02 | Stop reason: HOSPADM

## 2021-07-31 RX ORDER — OXYCODONE HYDROCHLORIDE AND ACETAMINOPHEN 5; 325 MG/1; MG/1
1 TABLET ORAL EVERY 4 HOURS PRN
Status: DISCONTINUED | OUTPATIENT
Start: 2021-07-31 | End: 2021-08-02 | Stop reason: HOSPADM

## 2021-07-31 RX ORDER — LIDOCAINE HYDROCHLORIDE 10 MG/ML
INJECTION, SOLUTION INFILTRATION; PERINEURAL
Status: COMPLETED
Start: 2021-07-31 | End: 2021-07-31

## 2021-07-31 RX ORDER — OXYCODONE HYDROCHLORIDE AND ACETAMINOPHEN 5; 325 MG/1; MG/1
2 TABLET ORAL EVERY 4 HOURS PRN
Status: DISCONTINUED | OUTPATIENT
Start: 2021-07-31 | End: 2021-08-02 | Stop reason: HOSPADM

## 2021-07-31 RX ORDER — DOCUSATE SODIUM 100 MG/1
100 CAPSULE, LIQUID FILLED ORAL 2 TIMES DAILY PRN
Status: DISCONTINUED | OUTPATIENT
Start: 2021-07-31 | End: 2021-08-02 | Stop reason: HOSPADM

## 2021-07-31 RX ORDER — SODIUM CHLORIDE, SODIUM LACTATE, POTASSIUM CHLORIDE, CALCIUM CHLORIDE 600; 310; 30; 20 MG/100ML; MG/100ML; MG/100ML; MG/100ML
1000 INJECTION, SOLUTION INTRAVENOUS CONTINUOUS
Status: DISCONTINUED | OUTPATIENT
Start: 2021-07-31 | End: 2021-08-01

## 2021-07-31 RX ORDER — SODIUM CHLORIDE, SODIUM LACTATE, POTASSIUM CHLORIDE, CALCIUM CHLORIDE 600; 310; 30; 20 MG/100ML; MG/100ML; MG/100ML; MG/100ML
INJECTION, SOLUTION INTRAVENOUS PRN
Status: DISCONTINUED | OUTPATIENT
Start: 2021-07-31 | End: 2021-08-02 | Stop reason: HOSPADM

## 2021-07-31 RX ORDER — MISOPROSTOL 200 UG/1
600 TABLET ORAL
Status: DISCONTINUED | OUTPATIENT
Start: 2021-07-31 | End: 2021-08-02 | Stop reason: HOSPADM

## 2021-07-31 RX ORDER — ALUMINA, MAGNESIA, AND SIMETHICONE 2400; 2400; 240 MG/30ML; MG/30ML; MG/30ML
30 SUSPENSION ORAL EVERY 6 HOURS PRN
Status: DISCONTINUED | OUTPATIENT
Start: 2021-07-31 | End: 2021-07-31 | Stop reason: HOSPADM

## 2021-07-31 RX ORDER — DEXTROSE, SODIUM CHLORIDE, SODIUM LACTATE, POTASSIUM CHLORIDE, AND CALCIUM CHLORIDE 5; .6; .31; .03; .02 G/100ML; G/100ML; G/100ML; G/100ML; G/100ML
INJECTION, SOLUTION INTRAVENOUS CONTINUOUS
Status: DISCONTINUED | OUTPATIENT
Start: 2021-07-31 | End: 2021-08-02 | Stop reason: HOSPADM

## 2021-07-31 RX ORDER — ACETAMINOPHEN 325 MG/1
325 TABLET ORAL EVERY 4 HOURS PRN
Status: DISCONTINUED | OUTPATIENT
Start: 2021-07-31 | End: 2021-07-31

## 2021-07-31 RX ORDER — ONDANSETRON 4 MG/1
4 TABLET, ORALLY DISINTEGRATING ORAL EVERY 6 HOURS PRN
Status: DISCONTINUED | OUTPATIENT
Start: 2021-07-31 | End: 2021-08-02 | Stop reason: HOSPADM

## 2021-07-31 RX ORDER — MISOPROSTOL 200 UG/1
800 TABLET ORAL
Status: DISCONTINUED | OUTPATIENT
Start: 2021-07-31 | End: 2021-07-31 | Stop reason: HOSPADM

## 2021-07-31 RX ORDER — ROPIVACAINE HYDROCHLORIDE 2 MG/ML
INJECTION, SOLUTION EPIDURAL; INFILTRATION; PERINEURAL
Status: COMPLETED
Start: 2021-07-31 | End: 2021-07-31

## 2021-07-31 RX ORDER — HYDROXYZINE 50 MG/1
50 TABLET, FILM COATED ORAL EVERY 6 HOURS PRN
Status: DISCONTINUED | OUTPATIENT
Start: 2021-07-31 | End: 2021-07-31 | Stop reason: HOSPADM

## 2021-07-31 RX ORDER — IBUPROFEN 800 MG/1
800 TABLET ORAL EVERY 8 HOURS PRN
Status: DISCONTINUED | OUTPATIENT
Start: 2021-07-31 | End: 2021-08-02 | Stop reason: HOSPADM

## 2021-07-31 RX ORDER — CARBOPROST TROMETHAMINE 250 UG/ML
250 INJECTION, SOLUTION INTRAMUSCULAR
Status: DISCONTINUED | OUTPATIENT
Start: 2021-07-31 | End: 2021-07-31 | Stop reason: HOSPADM

## 2021-07-31 RX ORDER — VITAMIN A ACETATE, BETA CAROTENE, ASCORBIC ACID, CHOLECALCIFEROL, .ALPHA.-TOCOPHEROL ACETATE, DL-, THIAMINE MONONITRATE, RIBOFLAVIN, NIACINAMIDE, PYRIDOXINE HYDROCHLORIDE, FOLIC ACID, CYANOCOBALAMIN, CALCIUM CARBONATE, FERROUS FUMARATE, ZINC OXIDE, CUPRIC OXIDE 3080; 12; 120; 400; 1; 1.84; 3; 20; 22; 920; 25; 200; 27; 10; 2 [IU]/1; UG/1; MG/1; [IU]/1; MG/1; MG/1; MG/1; MG/1; MG/1; [IU]/1; MG/1; MG/1; MG/1; MG/1; MG/1
1 TABLET, FILM COATED ORAL
Status: DISCONTINUED | OUTPATIENT
Start: 2021-08-01 | End: 2021-08-02 | Stop reason: HOSPADM

## 2021-07-31 RX ORDER — ONDANSETRON 2 MG/ML
4 INJECTION INTRAMUSCULAR; INTRAVENOUS EVERY 6 HOURS PRN
Status: DISCONTINUED | OUTPATIENT
Start: 2021-07-31 | End: 2021-08-02 | Stop reason: HOSPADM

## 2021-07-31 RX ADMIN — ACETAMINOPHEN 650 MG: 325 TABLET, FILM COATED ORAL at 07:43

## 2021-07-31 RX ADMIN — ONDANSETRON 4 MG: 2 INJECTION INTRAMUSCULAR; INTRAVENOUS at 16:31

## 2021-07-31 RX ADMIN — LIDOCAINE HYDROCHLORIDE: 10 INJECTION, SOLUTION INFILTRATION; PERINEURAL at 17:45

## 2021-07-31 RX ADMIN — SODIUM CHLORIDE, POTASSIUM CHLORIDE, SODIUM LACTATE AND CALCIUM CHLORIDE 1000 ML: 600; 310; 30; 20 INJECTION, SOLUTION INTRAVENOUS at 06:12

## 2021-07-31 RX ADMIN — LIDOCAINE HYDROCHLORIDE,EPINEPHRINE BITARTRATE 3 ML: 15; .005 INJECTION, SOLUTION EPIDURAL; INFILTRATION; INTRACAUDAL; PERINEURAL at 10:39

## 2021-07-31 RX ADMIN — OXYTOCIN 125 ML/HR: 10 INJECTION, SOLUTION INTRAMUSCULAR; INTRAVENOUS at 18:38

## 2021-07-31 RX ADMIN — ROPIVACAINE HYDROCHLORIDE 200 MG: 2 INJECTION, SOLUTION EPIDURAL; INFILTRATION at 10:49

## 2021-07-31 RX ADMIN — OXYTOCIN 1 MILLI-UNITS/MIN: 10 INJECTION, SOLUTION INTRAMUSCULAR; INTRAVENOUS at 06:13

## 2021-07-31 ASSESSMENT — PAIN DESCRIPTION - PAIN TYPE
TYPE: ACUTE PAIN

## 2021-07-31 ASSESSMENT — PATIENT HEALTH QUESTIONNAIRE - PHQ9
SUM OF ALL RESPONSES TO PHQ9 QUESTIONS 1 AND 2: 0
2. FEELING DOWN, DEPRESSED, IRRITABLE, OR HOPELESS: NOT AT ALL
1. LITTLE INTEREST OR PLEASURE IN DOING THINGS: NOT AT ALL

## 2021-07-31 ASSESSMENT — LIFESTYLE VARIABLES: EVER_SMOKED: NEVER

## 2021-07-31 ASSESSMENT — PAIN SCALES - GENERAL: PAIN_LEVEL: 1

## 2021-07-31 NOTE — PROGRESS NOTES
0700 Received report from Francesca.  Patient sitting up in bed relaxing. Head to toe assessment completed. POC discussed.

## 2021-07-31 NOTE — ANESTHESIA PROCEDURE NOTES
Epidural Block    Date/Time: 7/31/2021 10:39 AM  Performed by: Abelino Davis M.D.  Authorized by: Abelino Davis M.D.     Start Time:  7/31/2021 10:39 AM  End Time:  7/31/2021 10:41 AM  Reason for Block: labor analgesia    patient identified, IV checked, site marked, risks and benefits discussed, surgical consent, monitors and equipment checked, pre-op evaluation, timeout performed and at patient's request    Patient Position:  Sitting  Prep: Betadine    Monitoring:  Blood pressure  Approach:  Midline  Location:  L3-L4  Injection Technique:  ZARI air  Strength:  1%  Dose:  3ml  Needle Type:  Tuohy  Needle Gauge:  17 G  Needle Length:  3.5 in  Loss of resistance::  6  Catheter at Skin Depth:  15  Test Dose Result:  Negative

## 2021-07-31 NOTE — CARE PLAN
Problem: Psychosocial - L&D  Goal: Patient's level of anxiety will decrease  Outcome: Progressing  Discussed coping techniques and breathing exercises with patient. Asked patient if she had any questions and discussed POC.      Problem: Pain  Goal: Patient's pain will be alleviated or reduced to the patient’s comfort goal  Outcome: Progressing  Discussed options for pain control such as epidural and non pharmaceutical techniques. Patient requested birthing ball at this time.     Problem: Risk for Infection and Impaired Wound Healing  Goal: Patient will remain free from infection  Outcome: Progressing  Performed hand hygiene and sterile technique during exams.      The patient is Stable - Low risk of patient condition declining or worsening

## 2021-07-31 NOTE — ANESTHESIA PREPROCEDURE EVALUATION
Relevant Problems   No relevant active problems       Physical Exam    Airway   Mallampati: I  TM distance: >3 FB  Neck ROM: full       Cardiovascular - normal exam  Rhythm: regular  Rate: normal     Dental - normal exam           Pulmonary   Breath sounds clear to auscultation     Abdominal - normal exam     Neurological - normal exam                 Anesthesia Plan    ASA 2       Plan - epidural   Neuraxial block will be labor analgesia          Plan Factors:   Patient did not smoke on day of procedure.                Informed Consent:      Use of blood products discussed with: patient whom consented to blood products.

## 2021-07-31 NOTE — H&P
History and physical     July 31, 2021    Chief complaint: Patient presents for induction    History of present illness: Patient is a 31-year-old female para 1 whose estimated due date is July 29 based on 8-week ultrasound making 40-2/7 weeks.  Patient presents for induction with a favorable cervix at 4 cm.    Medical history: Benign    Surgical history: Tubes placed in ears as a child, nose surgery, wisdom teeth    Habits: Patient denies tobacco alcohol or drug use    Allergies: Amoxicillin hives    Medications: None    Obstetric history: Patient is Rh+, rubella immune, group B strep negative    Family history: NoncontributoryTo current problem        Physical exam  Vitals: Normal  General: Patient is awake alert pleasant  Head: Atraumatic normocephalicmurmurs  Abdomen gravid: Estimated fetal weight 8 and half to 9 pounds   pelvic: Cervix 5 to 6 cm, 60% effaced, vertex, -2 station, amniotic sac ruptured fluid clear  Extremities: Normal    Assessment:  1-term intrauterine pregnancy at 40-2/7 weeks      Plan:  Plan is to proceed with induction.  Pros cons of been discussed

## 2021-08-01 LAB
ERYTHROCYTE [DISTWIDTH] IN BLOOD BY AUTOMATED COUNT: 45.9 FL (ref 35.9–50)
HCT VFR BLD AUTO: 29.3 % (ref 37–47)
HGB BLD-MCNC: 9.6 G/DL (ref 12–16)
MCH RBC QN AUTO: 29 PG (ref 27–33)
MCHC RBC AUTO-ENTMCNC: 32.8 G/DL (ref 33.6–35)
MCV RBC AUTO: 88.5 FL (ref 81.4–97.8)
PLATELET # BLD AUTO: 184 K/UL (ref 164–446)
PMV BLD AUTO: 10.6 FL (ref 9–12.9)
RBC # BLD AUTO: 3.31 M/UL (ref 4.2–5.4)
WBC # BLD AUTO: 16 K/UL (ref 4.8–10.8)

## 2021-08-01 PROCEDURE — 36415 COLL VENOUS BLD VENIPUNCTURE: CPT

## 2021-08-01 PROCEDURE — 700102 HCHG RX REV CODE 250 W/ 637 OVERRIDE(OP): Performed by: OBSTETRICS & GYNECOLOGY

## 2021-08-01 PROCEDURE — 85027 COMPLETE CBC AUTOMATED: CPT

## 2021-08-01 PROCEDURE — 770002 HCHG ROOM/CARE - OB PRIVATE (112)

## 2021-08-01 PROCEDURE — A9270 NON-COVERED ITEM OR SERVICE: HCPCS | Performed by: OBSTETRICS & GYNECOLOGY

## 2021-08-01 RX ORDER — ROPIVACAINE HYDROCHLORIDE 2 MG/ML
INJECTION, SOLUTION EPIDURAL; INFILTRATION; PERINEURAL CONTINUOUS
Status: DISCONTINUED | OUTPATIENT
Start: 2021-08-01 | End: 2021-08-01

## 2021-08-01 RX ORDER — SODIUM CHLORIDE, SODIUM LACTATE, POTASSIUM CHLORIDE, AND CALCIUM CHLORIDE .6; .31; .03; .02 G/100ML; G/100ML; G/100ML; G/100ML
250 INJECTION, SOLUTION INTRAVENOUS PRN
Status: DISCONTINUED | OUTPATIENT
Start: 2021-08-01 | End: 2021-08-01

## 2021-08-01 RX ORDER — SODIUM CHLORIDE, SODIUM LACTATE, POTASSIUM CHLORIDE, AND CALCIUM CHLORIDE .6; .31; .03; .02 G/100ML; G/100ML; G/100ML; G/100ML
1000 INJECTION, SOLUTION INTRAVENOUS
Status: DISCONTINUED | OUTPATIENT
Start: 2021-08-01 | End: 2021-08-01

## 2021-08-01 RX ADMIN — PRENATAL WITH FERROUS FUM AND FOLIC ACID 1 TABLET: 3080; 920; 120; 400; 22; 1.84; 3; 20; 10; 1; 12; 200; 27; 25; 2 TABLET ORAL at 09:20

## 2021-08-01 ASSESSMENT — EDINBURGH POSTNATAL DEPRESSION SCALE (EPDS)
THINGS HAVE BEEN GETTING ON TOP OF ME: NO, I HAVE BEEN COPING AS WELL AS EVER
I HAVE BEEN SO UNHAPPY THAT I HAVE HAD DIFFICULTY SLEEPING: NOT AT ALL
I HAVE FELT SCARED OR PANICKY FOR NO GOOD REASON: NO, NOT AT ALL
I HAVE BLAMED MYSELF UNNECESSARILY WHEN THINGS WENT WRONG: NO, NEVER
I HAVE LOOKED FORWARD WITH ENJOYMENT TO THINGS: AS MUCH AS I EVER DID
I HAVE BEEN ABLE TO LAUGH AND SEE THE FUNNY SIDE OF THINGS: AS MUCH AS I ALWAYS COULD
I HAVE BEEN SO UNHAPPY THAT I HAVE BEEN CRYING: NO, NEVER
I HAVE FELT SAD OR MISERABLE: NO, NOT AT ALL
I HAVE BEEN ANXIOUS OR WORRIED FOR NO GOOD REASON: NO, NOT AT ALL
THE THOUGHT OF HARMING MYSELF HAS OCCURRED TO ME: NEVER

## 2021-08-01 ASSESSMENT — PAIN DESCRIPTION - PAIN TYPE
TYPE: ACUTE PAIN

## 2021-08-01 NOTE — ANESTHESIA TIME REPORT
Anesthesia Start and Stop Event Times     Date Time Event    7/31/2021 1035 Ready for Procedure     1037 Anesthesia Start     1722 Anesthesia Stop        Responsible Staff  07/31/21    Name Role Begin End    Abelino Davis M.D. Anesth 1037 1722        Preop Diagnosis (Free Text):  Pre-op Diagnosis             Preop Diagnosis (Codes):    Post op Diagnosis  Pain during labor      Premium Reason  E. Weekend    Comments:

## 2021-08-01 NOTE — LACTATION NOTE
This note was copied from a baby's chart.  @1530 LC met with MOB for initial visit, MOB was attempting to breastfeed when LC arrived, she reports some discomfort, reminded of the importance of a deep latch and the damage caused by a shallow latch, she allowed LC to assist with latch attempt, baby initially seemed sleepy at the breast and both nutritive and non-nutritive suckling was noted, after moving baby to right breast using a football hold a deep latch and nutritive sustained suck was noted, educated on signs of a nutritive and non-nutritive suck, MOB reports history of difficulty breastfeeding her older child and low milk supply, extensive breastfeeding education provided, POB report baby has voided and stooled frequently, educated on signs that would indicate need for supplementation and supplement guidelines provided if needed    Written and verbal information provided on outpatient breastfeeding assistance available at the [facility] after discharge and encouraged to call to schedule consult as needed, informed that [facility] [place] is on hold for the time being, zoom meeting information provided as well    MOB denies having any additional questions or concerns for LC at this time    Encouraged to call for assistance as needed

## 2021-08-01 NOTE — L&D DELIVERY NOTE
Delivery note      July 31, 2021    Delivering physician: Vianney Saxena MD      Diagnoses:   1-Term intrauterine pregnancy  2-delivery of viable male Apgars 8/9      Procedure:    1-induction with vaginal delivery    Hospital course:    Patient is a 31-year-old female para 1 presented at 40-2/7 weeks for induction.  She was given Pitocin followed by rupture of amniotic sac.  She went to complete but the head remained at a -1 to -2 station so an intrauterine pressure catheter was placed.  Contractions were inadequate so she was augmented Pitocin she slowly went to complete.  Once we had adequate contractions we began pushing.  Patient pushed about an hour.  Her pushing was poor.    Once the head came out we had a little trouble getting the shoulder out mainly because of expulsive forces.  Samaria maneuver was used followed by placing a finger under the left axilla and the baby was delivered without complication.  Baby was vigorous moving all extremities.  Cord was clamped and cut.  Baby was handed off to mom with nurse present.  Apgars were 8/9.  Placenta delivered spontaneously and intact.  Exam of uterus revealed no retained placenta.    Estimated blood loss was 250 cc.    Second-degree tear was repaired with 3-0 Vicryl suture.

## 2021-08-01 NOTE — CARE PLAN
Problem: Pain - Standard  Goal: Alleviation of pain or a reduction in pain to the patient’s comfort goal  Outcome: Progressing     Problem: Knowledge Deficit - Postpartum  Goal: Patient will verbalize and demonstrate understanding of self and infant care  Outcome: Progressing     Problem: Psychosocial - Postpartum  Goal: Patient will verbalize and demonstrate effective bonding and parenting behavior  Outcome: Progressing     The patient is Stable - Low risk of patient condition declining or worsening    Shift Goals  Clinical Goals: pain control  Patient Goals: work on breastfeeding   Family Goals: bond with infant     Progress made toward(s) clinical / shift goals:  Patient requests to call for pain medication when needed. Patient aware of available prn medication and available nonpharmacologic pain intervention. Patient able to care for self and infant at current pain level. Lactation support offered. Skin to skin and hand expression encouraged. Patient provided undisturbed time to bond with infant. Patient seen breastfeeding and doing skin to skin.   Patient is not progressing towards the following goals:

## 2021-08-01 NOTE — PROGRESS NOTES
Patient offered help with latching infant. Patient educated on breastfeeding algorithm. Patient wishes to initiate pumping at this time even after education. Patient declining lactation help at this time. Patient wishes to initiate pumping due to low milk supply on previous pregnancy. Patient provided hospital pump and supplies. Reviewed pumping with patient. All questions answered.

## 2021-08-01 NOTE — CARE PLAN
The patient is Stable - Low risk of patient condition declining or worsening    Shift Goals  Clinical Goals: patient will have acceptable pain level, patient fundus will be firm with lochia within normal limits    Progress made toward(s) clinical / shift goals:  Yes. Patient denies having pain, patient educated to notify RN if patient is in pain, patient verbalizes understanding. Patient is able to ambulate and provide care for self and infant. Patient fundus noted to be firm with light lochia.     Patient is not progressing towards the following goals: N/A

## 2021-08-01 NOTE — PROGRESS NOTES
Progress Note        Subjective: Patient is doing well lochia is normal.    Objective: Vital signs are normal  General: Patient's awake alert pleasant  Head: Atraumatic normocephalic  Abdomen: Fundus is firm nontender    Assessment:  Postpartum day 1 vaginal delivery    Plan:  Patient will be discharged home  She is to follow-up in the office in 4-6 weeks  Infectious and bleeding precautions reviewed  Patient to take prenatal vitamin while breast-feeding    Vianney Saxena MD

## 2021-08-01 NOTE — PROGRESS NOTES
1230 Pushing commenced.   1252 Pt tired and unable to push effectively due to fetal station and positioning. Will labor down. Pt encouraged to rest.   1500 Dr. Saxena at bedside. SVE Will continue to labor down while he completes c/s.   1630 Pushing commenced.   1700 Dr. Saxena at bedside. Pushing with pt.   1721:50 Head delivered. Pt in [facility], Pt encouraged to push harder.   1722:45 Baby delivered. APGARS 8/9.   1900 Report to [facility] Villegas, at bedside.

## 2021-08-01 NOTE — PROGRESS NOTES
1900 Report received from Monster EAGLE and Elly EAGLE  1920 Dr. Saxena notified of pt temperature, pt may transfer to PP  2000 Pt ambulated to bathroom in stable condition at this time. Pt able to void. Natalie pads changed.  2015 Pt transferred to PP in stable condition via wheel chair with infant in arms. Report given to Iwona EAGLE. Infant bands matched x2 cuddles active.

## 2021-08-01 NOTE — PROGRESS NOTES
"0700:Report received from Iwona EAGLE. Assumed Patient care. Patient appears calm and in no acute distress. Labs and orders reviewed.    0900: Assessment completed. Fundus firm, light lochia noted. Patient denies any pain at this time, patient educated to notify RN if patient is in pain, paitent verbalizes understanding.Patient provided with scheduled medication, refer to MAR. Plan of care discussed with patient; questions have been answered at this time. Patient educated to notify RN of infant next feeding in order to assess infant latch and assist with breastfeeding if needed, patient verbalizes understanding. Patient needs have been met at this time. Patient encouraged to call when needing assistance. Call light within reach. Rounding in place. /56   Pulse 87   Temp 36.4 °C (97.6 °F) (Oral)   Resp 18   Ht 1.676 m (5' 6\")   Wt 101 kg (223 lb)   SpO2 99%   Breastfeeding Yes   BMI 35.99 kg/m² .   "

## 2021-08-01 NOTE — ANESTHESIA POSTPROCEDURE EVALUATION
Patient: Brooke Thomas    Procedure Summary     Date: 07/31/21 Room / Location:     Anesthesia Start: 1037 Anesthesia Stop: 1722    Procedure: Labor Epidural Diagnosis:     Scheduled Providers:  Responsible Provider: Abelino Davis M.D.    Anesthesia Type: epidural ASA Status: 2          Final Anesthesia Type: epidural  Last vitals  BP   Blood Pressure: 107/72    Temp   37.4 °C (99.3 °F)    Pulse   100   Resp   18    SpO2   96 %      Anesthesia Post Evaluation    Patient location during evaluation: PACU  Patient participation: complete - patient participated  Level of consciousness: awake  Pain score: 1    Airway patency: patent  Anesthetic complications: no  Cardiovascular status: adequate  Respiratory status: acceptable  Hydration status: acceptable    PONV: none          No complications documented.     Nurse Pain Score: 3 (NPRS)

## 2021-08-02 VITALS
RESPIRATION RATE: 16 BRPM | TEMPERATURE: 97.4 F | OXYGEN SATURATION: 98 % | HEART RATE: 85 BPM | HEIGHT: 66 IN | DIASTOLIC BLOOD PRESSURE: 61 MMHG | WEIGHT: 223 LBS | BODY MASS INDEX: 35.84 KG/M2 | SYSTOLIC BLOOD PRESSURE: 114 MMHG

## 2021-08-02 PROCEDURE — 700102 HCHG RX REV CODE 250 W/ 637 OVERRIDE(OP): Performed by: OBSTETRICS & GYNECOLOGY

## 2021-08-02 PROCEDURE — A9270 NON-COVERED ITEM OR SERVICE: HCPCS | Performed by: OBSTETRICS & GYNECOLOGY

## 2021-08-02 RX ADMIN — PRENATAL WITH FERROUS FUM AND FOLIC ACID 1 TABLET: 3080; 920; 120; 400; 22; 1.84; 3; 20; 10; 1; 12; 200; 27; 25; 2 TABLET ORAL at 09:56

## 2021-08-02 NOTE — DISCHARGE INSTRUCTIONS
PATIENT DISCHARGE EDUCATION INSTRUCTION SHEET    REASONS TO CALL YOUR OBSTETRICIAN  · Persistent fever, shaking, chills (Temperature higher than 100.4) may indicate you have an infection  · Heavy bleeding: soaking more than 1 pad per hour; Passing clots an egg-sized clot or bigger may mean you have an postpartum hemorrhage  · Foul odor from vagina or bad smelling or discolored discharge or blood  · Breast infection (Mastitis symptoms); breast pain, chills, fever, redness or red streaks, may feel flu like symptoms  · Urinary pain, burning or frequency  · Incision that is not healing, increased redness, swelling, tenderness or pain, or any pus from episiotomy or  site may mean you have an infection  · Redness, swelling, warmth, or painful to touch in the calf area of your leg may mean you have a blood clot  · Severe or intensified depression, thoughts or feelings of wanting to hurt yourself or someone else   · Pain in chest, obstructed breathing or shortness of breath (trouble catching your breath) may mean you are having a postpartum complication. Call your provider immediately   · Headache that does not get better, even after taking medicine, a bad headache with vision changes or pain in the upper right area of your belly may mean you have high blood pressure or post birth preeclampsia. Call your provider immediately    HAND WASHING  All family and friends should wash their hands:  · Before and after holding the baby  · Before feeding the baby  · After using the restroom or changing the baby's diaper    WOUND CARE  Ask your physician for additional care instructions. In general:  · Episiotomy/Laceration  · May use talha-spray bottle, witch hazel pads and dermaplast spray for comfort  · Use talha-spray bottle after urinating to cleanse perineal area  · To prevent burning during urination spray talha-water bottle on labial area   · Pat perineal area dry until episiotomy/laceration is healed  · Continue to use  talha-bottle until bleeding stops as needed  · If have a 2nd degree laceration or greater, a Sitz bath can offer relief from soreness, burning, and inflammation   · Sitz Bath   · Sit in 6 inches of warm water and soak laceration as needed until the laceration heals    VAGINAL CARE AND BLEEDING  · Nothing inside vagina for 6 weeks:   · No sexual intercourse, tampons or douching  · Bleeding may continue for 2-4 weeks. Amount and color may vary  · Soaking 1 pad or more in an hour for several hours is considered heavy bleeding  · Passing large egg sized blood clots can be concerning  · If you feel like you have heavy bleeding or are having increasing amount of blood clots call your Obstetrician immediately  · If you begin feeling faint upon standing, feeling sick to your stomach, have clammy skin, a really fast heartbeat, have chills, start feeling confused, dizzy, sleepy or weak, or feeling like you're going to faint call your Obstetrician immediately    HYPERTENSION   Preeclampsia or gestational hypertension are types of high blood pressure that only pregnant women can get. It is important for you to be aware of symptoms to seek early intervention and treatment. If you have any of these symptoms immediately call your Obstetrician    · Vision changes or blurred vision   · Severe headache or pain that is unrelieved with medication and will not go away  · Persistent pain in upper abdomen or shoulder   · Increased swelling of face, feet, or hands  · Difficulty breathing or shortness of breath at rest  · Urinating less than usual    URINATION AND BOWEL MOVEMENTS  · Eating more fiber (bran cereal, fruits, and vegetables) and drinking plenty of fluids will help to avoid constipation  · Urinary frequency and urgency after childbirth is normal  · If you experience any urinary pain, burning or frequency call your provider    BIRTH CONTROL  · It is possible to become pregnant at any time after delivery and while  "breastfeeding  · Plan to discuss a method of birth control with your physician at your post delivery follow up visit    POSTPARTUM BLUES  During the first few days after birth, you may experience a sense of the \"blues\" which may include impatience, irritability or even crying. These feelings come and go quickly. However, as many as 1 in 10 women experience emotional symptoms known as postpartum depression.     POSTPARTUM DEPRESSION    May start as early as the second or third day after delivery or take several weeks or months to develop. Symptoms of \"blues\" are present, but are more intense: Crying spells; loss of appetite; feelings of hopelessness or loss of control; fear of touching the baby; over concern or no concern at all about the baby; little or no concern about your own appearance/caring for yourself; and/or inability to sleep or excessive sleeping. Contact your Obstetrician if you are experiencing any of these symptoms     PREVENTING SHAKEN BABY  If you are angry or stressed, PUT THE BABY IN THE CRIB, step away, take some deep breaths, and wait until you are calm to care for the baby. DO NOT SHAKE THE BABY. You are not alone, call a supporter for help.  · [facility] 24/7 crisis call line (398-131-8219) or (1-206.711.9342)  · You can also text them, text \"ANSWER\" (536954)      "

## 2021-08-02 NOTE — DISCHARGE SUMMARY
Discharge summary    Date of admission:    July 31, 2021  Date of discharge:     August 2, 2021    Discharge diagnoses:  1-term intrauterine pregnancy   2-delivery of viable male Apgars 8/9    Procedures:    1-induction with vaginal delivery      Hospital course:    Patient is a 31-year-old white female para 1,  who presented at term in active labor    Patient had an uncomplicated vaginal delivery.    Her postpartum course was benign with normal lochia.  Patient was subsequently discharged home      Progress Note    Subjective: Patient is doing well lochia is normal.  Has normal GI and  function.     Objective: Vital signs are normal  General: Patient's awake alert pleasant  Head: Atraumatic normocephalic  Abdomen: Fundus is firm, appropriate tenderness       Assessment:  Term intrauterine pregnancy  Postpartum day 2 after vaginal delivery    Plan:  DC home  F/u in 4-6 weeks  Activity infectious precautions reviewed    Discharge medications:  None

## 2021-08-02 NOTE — CARE PLAN
The patient is stable, and breastfeeding well independently    Shift Goals  Clinical Goals: rest, pain control  Patient Goals: work on breastfeeding   Family Goals: bond with infant     Progress made toward(s) clinical / shift goals:  progressing    Patient is not progressing towards the following goals: N/A  Problem: Pain - Standard  Goal: Alleviation of pain or a reduction in pain to the patient’s comfort goal  Outcome: Progressing  Note: Denies pain at this time. Will be medicated as needed.     Problem: Knowledge Deficit - Postpartum  Goal: Patient will verbalize and demonstrate understanding of self and infant care  Outcome: Progressing  Note: Able to care for herself and infant

## 2021-08-02 NOTE — PROGRESS NOTES
12-hour chart check done.     Report received from Carmen EAGLE. Assumed care. Will discussed plan of care and will check patient and infant at intervals. Patient has a discharge order for today.

## 2021-08-02 NOTE — CARE PLAN
The patient is Stable - Low risk of patient condition declining or worsening    Shift Goals  Clinical Goals: To keep pain under control.  Patient Goals: To be able to go home today.      Progress made toward(s) clinical / shift goals:  Patient has a good pain control. She's ambulating well. Discharged home.

## 2022-06-24 ENCOUNTER — OFFICE VISIT (OUTPATIENT)
Dept: URGENT CARE | Facility: PHYSICIAN GROUP | Age: 32
End: 2022-06-24
Payer: COMMERCIAL

## 2022-06-24 VITALS
WEIGHT: 192 LBS | DIASTOLIC BLOOD PRESSURE: 78 MMHG | RESPIRATION RATE: 16 BRPM | TEMPERATURE: 98.2 F | SYSTOLIC BLOOD PRESSURE: 120 MMHG | BODY MASS INDEX: 30.86 KG/M2 | OXYGEN SATURATION: 98 % | HEART RATE: 95 BPM | HEIGHT: 66 IN

## 2022-06-24 DIAGNOSIS — R05.8 POST-VIRAL COUGH SYNDROME: ICD-10-CM

## 2022-06-24 PROCEDURE — 99202 OFFICE O/P NEW SF 15 MIN: CPT | Performed by: NURSE PRACTITIONER

## 2022-06-24 ASSESSMENT — ENCOUNTER SYMPTOMS
MYALGIAS: 0
HEMOPTYSIS: 0
PALPITATIONS: 0
SHORTNESS OF BREATH: 0
COUGH: 1
SPUTUM PRODUCTION: 1
CHILLS: 0
SORE THROAT: 0
SINUS PAIN: 0
WHEEZING: 0
DIAPHORESIS: 0
FEVER: 0
ORTHOPNEA: 0

## 2022-06-24 NOTE — PROGRESS NOTES
"Subjective     Brooke Thomas is a 32 y.o. female who presents with Cough (X 1 week  back pain with cough) and Congestion (green)            Brooke comes in today with a 1 week history of cough with chest congestion.  She reports cough is mildly productive for sputum.  Symptoms wax and wane.  She felt ill with URI symptoms at onset of illness but is trending better.  No known exposure to covid or influenza.  Taking OTC cough syrup with some relief.      Review of Systems   Constitutional: Negative for chills, diaphoresis, fever and malaise/fatigue.   HENT: Negative for congestion, ear pain, sinus pain and sore throat.    Respiratory: Positive for cough and sputum production. Negative for hemoptysis, shortness of breath and wheezing.    Cardiovascular: Negative for chest pain, palpitations and orthopnea.   Musculoskeletal: Negative for myalgias.     Medications, Allergies, and current problem list reviewed today in Epic         Objective     Blood Pressure 120/78   Pulse 95   Temperature 36.8 °C (98.2 °F) (Temporal)   Respiration 16   Height 1.676 m (5' 6\")   Weight 87.1 kg (192 lb)   Oxygen Saturation 98%   Breastfeeding Yes   Body Mass Index 30.99 kg/m²      Physical Exam  Vitals reviewed.   Constitutional:       General: She is not in acute distress.     Appearance: Normal appearance. She is well-developed. She is not ill-appearing, toxic-appearing or diaphoretic.   HENT:      Head: Normocephalic.      Right Ear: Tympanic membrane, ear canal and external ear normal. There is no impacted cerumen.      Left Ear: Tympanic membrane, ear canal and external ear normal. There is no impacted cerumen.      Nose: Nose normal.      Mouth/Throat:      Mouth: Mucous membranes are moist.      Pharynx: No oropharyngeal exudate.   Eyes:      General: No scleral icterus.        Right eye: No discharge.         Left eye: No discharge.      Conjunctiva/sclera: Conjunctivae normal.   Neck:      Thyroid: No " thyromegaly.      Vascular: No JVD.      Trachea: No tracheal deviation.   Cardiovascular:      Rate and Rhythm: Regular rhythm. Tachycardia present.      Heart sounds: Normal heart sounds. No murmur heard.    No friction rub. No gallop.   Pulmonary:      Effort: Pulmonary effort is normal. No respiratory distress.      Breath sounds: Normal breath sounds. No stridor. No wheezing, rhonchi or rales.   Chest:      Chest wall: No tenderness.   Musculoskeletal:      Cervical back: Neck supple.   Lymphadenopathy:      Cervical: No cervical adenopathy.   Skin:     General: Skin is warm and dry.      Coloration: Skin is not jaundiced or pale.   Neurological:      Mental Status: She is alert and oriented to person, place, and time.                             Assessment & Plan        1. Post-viral cough syndrome    Discussed exam findings with Brooke.  Likely a resolving, self-limited illness.  No indication for further care at this time.  Follow up in 1-2 weeks for any persistent or worsening symptoms.  She verbalized understanding of and agreed with plan of care.

## 2023-02-16 ENCOUNTER — OFFICE VISIT (OUTPATIENT)
Dept: URGENT CARE | Facility: PHYSICIAN GROUP | Age: 33
End: 2023-02-16
Payer: COMMERCIAL

## 2023-02-16 VITALS
DIASTOLIC BLOOD PRESSURE: 80 MMHG | OXYGEN SATURATION: 99 % | BODY MASS INDEX: 29.57 KG/M2 | WEIGHT: 184 LBS | SYSTOLIC BLOOD PRESSURE: 110 MMHG | TEMPERATURE: 98.6 F | HEIGHT: 66 IN | RESPIRATION RATE: 16 BRPM | HEART RATE: 108 BPM

## 2023-02-16 DIAGNOSIS — R05.1 ACUTE COUGH: ICD-10-CM

## 2023-02-16 DIAGNOSIS — H10.33 ACUTE BACTERIAL CONJUNCTIVITIS OF BOTH EYES: ICD-10-CM

## 2023-02-16 DIAGNOSIS — J22 LRTI (LOWER RESPIRATORY TRACT INFECTION): ICD-10-CM

## 2023-02-16 PROCEDURE — 99213 OFFICE O/P EST LOW 20 MIN: CPT | Performed by: NURSE PRACTITIONER

## 2023-02-16 RX ORDER — POLYMYXIN B SULFATE AND TRIMETHOPRIM 1; 10000 MG/ML; [USP'U]/ML
1 SOLUTION OPHTHALMIC EVERY 4 HOURS
Qty: 10 ML | Refills: 0 | Status: SHIPPED | OUTPATIENT
Start: 2023-02-16 | End: 2023-02-16

## 2023-02-16 RX ORDER — BENZONATATE 200 MG/1
CAPSULE ORAL
COMMUNITY
Start: 2022-12-07 | End: 2023-02-16

## 2023-02-16 RX ORDER — DOXYCYCLINE HYCLATE 100 MG
100 TABLET ORAL 2 TIMES DAILY
Qty: 14 TABLET | Refills: 0 | Status: SHIPPED | OUTPATIENT
Start: 2023-02-16 | End: 2023-02-23

## 2023-02-16 RX ORDER — OFLOXACIN 3 MG/ML
1 SOLUTION/ DROPS OPHTHALMIC 4 TIMES DAILY
Qty: 5 ML | Refills: 0 | Status: SHIPPED | OUTPATIENT
Start: 2023-02-16 | End: 2023-02-21

## 2023-02-16 RX ORDER — AZITHROMYCIN 500 MG/1
500 TABLET, FILM COATED ORAL DAILY
COMMUNITY
Start: 2022-12-07 | End: 2023-02-16

## 2023-02-16 ASSESSMENT — ENCOUNTER SYMPTOMS
EYE DISCHARGE: 1
RHINORRHEA: 1
EYE REDNESS: 1
SORE THROAT: 0
SHORTNESS OF BREATH: 0
COUGH: 1
WHEEZING: 0
EYE PAIN: 0

## 2023-02-17 NOTE — PROGRESS NOTES
Subjective:   Brooke Thomas is a 32 y.o. female who presents for Cough (X 6 days feels like she has water in her lungs), Nasal Congestion (X 6 days), and Back Pain (Mid right side)      URI   This is a new problem. The current episode started in the past 7 days. The problem has been gradually worsening. There has been no fever. Associated symptoms include congestion, coughing and rhinorrhea. Pertinent negatives include no ear pain, joint swelling, plugged ear sensation, rash, sore throat or wheezing. Associated symptoms comments: Right side back pain  . She has tried acetaminophen for the symptoms. The treatment provided no relief.     Review of Systems   Constitutional:  Negative for chills, fever and malaise/fatigue.   HENT:  Positive for congestion and rhinorrhea. Negative for ear pain and sore throat.    Eyes:  Positive for discharge and redness. Negative for pain.   Respiratory:  Positive for cough. Negative for shortness of breath and wheezing.    Cardiovascular:  Negative for chest pain.   Gastrointestinal:  Negative for abdominal pain, nausea and vomiting.   Genitourinary:  Negative for hematuria.   Musculoskeletal:  Positive for back pain. Negative for myalgias.   Skin:  Negative for rash.   Neurological:  Negative for dizziness and headaches.     Medications:    doxycycline Tabs  polymixin-trimethoprim Soln    Allergies: Food, Amoxicillin, and Pcn [penicillins]    Problem List: Brooke Thomas does not have any pertinent problems on file.    Surgical History:  Past Surgical History:   Procedure Laterality Date    SEPTOPLASTY      SEPTOPLASTY      [facility] - 16 yrs old       Past Social Hx: Brooke Thomas  reports that she has never smoked. She has never used smokeless tobacco. She reports that she does not drink alcohol and does not use drugs.     Past Family Hx:  Brooke Thomas family history includes Lung Disease in her brother and paternal grandfather.     Problem list,  "medications, and allergies reviewed by myself today in Epic.     Objective:     /80   Pulse (!) 108   Temp 37 °C (98.6 °F) (Temporal)   Resp 16   Ht 1.676 m (5' 6\")   Wt 83.5 kg (184 lb)   SpO2 99%   BMI 29.70 kg/m²     Physical Exam  Vitals and nursing note reviewed.   Constitutional:       General: She is not in acute distress.     Appearance: She is well-developed.   HENT:      Head: Normocephalic and atraumatic. No right periorbital erythema or left periorbital erythema.      Right Ear: Tympanic membrane and external ear normal.      Left Ear: Tympanic membrane and external ear normal.      Nose: Nose normal.      Right Sinus: No maxillary sinus tenderness or frontal sinus tenderness.      Left Sinus: No maxillary sinus tenderness or frontal sinus tenderness.      Mouth/Throat:      Mouth: Mucous membranes are moist.      Pharynx: Uvula midline. No posterior oropharyngeal erythema.      Tonsils: No tonsillar exudate or tonsillar abscesses.   Eyes:      General:         Right eye: Discharge present.         Left eye: Discharge present.     Conjunctiva/sclera:      Right eye: Right conjunctiva is injected. No exudate.     Left eye: Left conjunctiva is injected. No exudate.  Cardiovascular:      Rate and Rhythm: Normal rate.   Pulmonary:      Effort: Pulmonary effort is normal. No respiratory distress.      Breath sounds: Rhonchi present. No wheezing or rales.   Abdominal:      General: There is no distension.   Musculoskeletal:         General: Normal range of motion.   Skin:     General: Skin is warm and dry.   Neurological:      General: No focal deficit present.      Mental Status: She is alert and oriented to person, place, and time. Mental status is at baseline.      Gait: Gait (gait at baseline) normal.   Psychiatric:         Judgment: Judgment normal.       Assessment/Plan:     Diagnosis and associated orders:     1. LRTI (lower respiratory tract infection)  doxycycline (VIBRAMYCIN) 100 MG Tab "      2. Acute cough        3. Acute bacterial conjunctivitis of both eyes  ofloxacin (OCUFLOX) 0.3 % Solution    DISCONTINUED: polymixin-trimethoprim (POLYTRIM) 17061-9.1 UNIT/ML-% Solution           Comments/MDM:     I personally reviewed prior external notes and prior test results pertinent to today's visit.  X-ray unavailable clinical condition, concerns of pneumonia she will be treated with doxycycline she will return to clinic if symptoms do not improve and/or worsen chest x-ray at this time.  Discussed management options, risks and benefits, and alternatives to treatment plan agreed upon.   Red flags discussed and indications to immediately call 911 or present to the Emergency Department.   Supportive care, differential diagnoses, and indications for immediate follow-up discussed with patient.    Patient expresses understanding and agrees to plan. Patient denies any other questions or concerns.                Please note that this dictation was created using voice recognition software. I have made a reasonable attempt to correct obvious errors, but I expect that there are errors of grammar and possibly content that I did not discover before finalizing the note.    This note was electronically signed by Jabari SIMMS

## 2023-08-11 ENCOUNTER — HOSPITAL ENCOUNTER (OUTPATIENT)
Dept: LAB | Facility: MEDICAL CENTER | Age: 33
End: 2023-08-11
Attending: STUDENT IN AN ORGANIZED HEALTH CARE EDUCATION/TRAINING PROGRAM
Payer: COMMERCIAL

## 2023-08-11 LAB
25(OH)D3 SERPL-MCNC: 37 NG/ML (ref 30–100)
ALBUMIN SERPL BCP-MCNC: 5.2 G/DL (ref 3.2–4.9)
ALBUMIN/GLOB SERPL: 1.7 G/DL
ALP SERPL-CCNC: 61 U/L (ref 30–99)
ALT SERPL-CCNC: 12 U/L (ref 2–50)
ANION GAP SERPL CALC-SCNC: 9 MMOL/L (ref 7–16)
AST SERPL-CCNC: 17 U/L (ref 12–45)
BASOPHILS # BLD AUTO: 0.5 % (ref 0–1.8)
BASOPHILS # BLD: 0.04 K/UL (ref 0–0.12)
BILIRUB SERPL-MCNC: 0.5 MG/DL (ref 0.1–1.5)
BUN SERPL-MCNC: 12 MG/DL (ref 8–22)
CALCIUM ALBUM COR SERPL-MCNC: 8.9 MG/DL (ref 8.5–10.5)
CALCIUM SERPL-MCNC: 9.9 MG/DL (ref 8.5–10.5)
CHLORIDE SERPL-SCNC: 103 MMOL/L (ref 96–112)
CHOLEST SERPL-MCNC: 155 MG/DL (ref 100–199)
CO2 SERPL-SCNC: 26 MMOL/L (ref 20–33)
CREAT SERPL-MCNC: 0.6 MG/DL (ref 0.5–1.4)
EOSINOPHIL # BLD AUTO: 0.14 K/UL (ref 0–0.51)
EOSINOPHIL NFR BLD: 1.9 % (ref 0–6.9)
ERYTHROCYTE [DISTWIDTH] IN BLOOD BY AUTOMATED COUNT: 43 FL (ref 35.9–50)
EST. AVERAGE GLUCOSE BLD GHB EST-MCNC: 111 MG/DL
FASTING STATUS PATIENT QL REPORTED: NORMAL
GFR SERPLBLD CREATININE-BSD FMLA CKD-EPI: 120 ML/MIN/1.73 M 2
GLOBULIN SER CALC-MCNC: 3.1 G/DL (ref 1.9–3.5)
GLUCOSE SERPL-MCNC: 89 MG/DL (ref 65–99)
HBA1C MFR BLD: 5.5 % (ref 4–5.6)
HCT VFR BLD AUTO: 43.1 % (ref 37–47)
HDLC SERPL-MCNC: 51 MG/DL
HGB BLD-MCNC: 14.4 G/DL (ref 12–16)
IMM GRANULOCYTES # BLD AUTO: 0.02 K/UL (ref 0–0.11)
IMM GRANULOCYTES NFR BLD AUTO: 0.3 % (ref 0–0.9)
LDLC SERPL CALC-MCNC: 83 MG/DL
LYMPHOCYTES # BLD AUTO: 1.97 K/UL (ref 1–4.8)
LYMPHOCYTES NFR BLD: 26.3 % (ref 22–41)
MCH RBC QN AUTO: 30 PG (ref 27–33)
MCHC RBC AUTO-ENTMCNC: 33.4 G/DL (ref 32.2–35.5)
MCV RBC AUTO: 89.8 FL (ref 81.4–97.8)
MONOCYTES # BLD AUTO: 0.5 K/UL (ref 0–0.85)
MONOCYTES NFR BLD AUTO: 6.7 % (ref 0–13.4)
NEUTROPHILS # BLD AUTO: 4.82 K/UL (ref 1.82–7.42)
NEUTROPHILS NFR BLD: 64.3 % (ref 44–72)
NRBC # BLD AUTO: 0 K/UL
NRBC BLD-RTO: 0 /100 WBC (ref 0–0.2)
PLATELET # BLD AUTO: 269 K/UL (ref 164–446)
PMV BLD AUTO: 10.7 FL (ref 9–12.9)
POTASSIUM SERPL-SCNC: 4 MMOL/L (ref 3.6–5.5)
PROT SERPL-MCNC: 8.3 G/DL (ref 6–8.2)
RBC # BLD AUTO: 4.8 M/UL (ref 4.2–5.4)
SODIUM SERPL-SCNC: 138 MMOL/L (ref 135–145)
T4 FREE SERPL-MCNC: 1.25 NG/DL (ref 0.93–1.7)
TRIGL SERPL-MCNC: 104 MG/DL (ref 0–149)
TSH SERPL DL<=0.005 MIU/L-ACNC: 2.74 UIU/ML (ref 0.38–5.33)
VIT B12 SERPL-MCNC: 762 PG/ML (ref 211–911)
WBC # BLD AUTO: 7.5 K/UL (ref 4.8–10.8)

## 2023-08-11 PROCEDURE — 80061 LIPID PANEL: CPT

## 2023-08-11 PROCEDURE — 82607 VITAMIN B-12: CPT

## 2023-08-11 PROCEDURE — 84443 ASSAY THYROID STIM HORMONE: CPT

## 2023-08-11 PROCEDURE — 82306 VITAMIN D 25 HYDROXY: CPT

## 2023-08-11 PROCEDURE — 80053 COMPREHEN METABOLIC PANEL: CPT

## 2023-08-11 PROCEDURE — 84439 ASSAY OF FREE THYROXINE: CPT

## 2023-08-11 PROCEDURE — 36415 COLL VENOUS BLD VENIPUNCTURE: CPT

## 2023-08-11 PROCEDURE — 83036 HEMOGLOBIN GLYCOSYLATED A1C: CPT

## 2023-08-11 PROCEDURE — 85025 COMPLETE CBC W/AUTO DIFF WBC: CPT

## 2023-11-16 NOTE — PROGRESS NOTES
Patient educated on plan of care. Patient asked to call for lactation support when needed.. Patient requesting to call for pain medication when needed. Patient has no needs or concerns at this time. Call light within reach.    Surgery

## 2025-08-05 ENCOUNTER — NON-PROVIDER VISIT (OUTPATIENT)
Dept: URGENT CARE | Facility: PHYSICIAN GROUP | Age: 35
End: 2025-08-05

## 2025-08-05 DIAGNOSIS — Z02.1 PRE-EMPLOYMENT DRUG SCREENING: ICD-10-CM

## 2025-08-05 DIAGNOSIS — Z02.83 ENCOUNTER FOR DRUG SCREENING: Primary | ICD-10-CM

## 2025-08-05 LAB
AMP AMPHETAMINE: NORMAL
COC COCAINE: NORMAL
INT CON NEG: NORMAL
INT CON POS: NORMAL
MET METHAMPHETAMINES: NORMAL
OPI OPIATES: NORMAL
PCP PHENCYCLIDINE: NORMAL
POC DRUG COMMENT 753798-OCCUPATIONAL HEALTH: NEGATIVE
THC: NORMAL

## 2025-08-05 PROCEDURE — 80305 DRUG TEST PRSMV DIR OPT OBS: CPT
